# Patient Record
Sex: FEMALE | Race: WHITE | Employment: UNEMPLOYED | URBAN - METROPOLITAN AREA
[De-identification: names, ages, dates, MRNs, and addresses within clinical notes are randomized per-mention and may not be internally consistent; named-entity substitution may affect disease eponyms.]

---

## 2017-02-23 PROBLEM — Z98.890 H/O LEEP: Status: ACTIVE | Noted: 2017-02-23

## 2017-02-23 PROBLEM — Z34.90 PREGNANCY: Status: ACTIVE | Noted: 2017-02-23

## 2017-09-19 PROBLEM — Z23 ENCOUNTER FOR IMMUNIZATION: Status: ACTIVE | Noted: 2017-09-19

## 2017-09-27 ENCOUNTER — HOSPITAL ENCOUNTER (INPATIENT)
Age: 32
LOS: 2 days | Discharge: HOME | End: 2017-09-29
Attending: OBSTETRICS & GYNECOLOGY | Admitting: OBSTETRICS & GYNECOLOGY
Payer: COMMERCIAL

## 2017-09-27 ENCOUNTER — ANESTHESIA (OUTPATIENT)
Dept: LABOR AND DELIVERY | Age: 32
End: 2017-09-27
Payer: COMMERCIAL

## 2017-09-27 ENCOUNTER — ANESTHESIA EVENT (OUTPATIENT)
Dept: LABOR AND DELIVERY | Age: 32
End: 2017-09-27
Payer: COMMERCIAL

## 2017-09-27 DIAGNOSIS — Z3A.39 39 WEEKS GESTATION OF PREGNANCY: Primary | ICD-10-CM

## 2017-09-27 PROBLEM — Z37.9 NORMAL LABOR: Status: ACTIVE | Noted: 2017-09-27

## 2017-09-27 LAB
A1 MICROGLOB PLACENTAL VAG QL: POSITIVE
ABO + RH BLD: NORMAL
BASE DEFICIT BLDCOA-SCNC: 7.5 MMOL/L (ref 0–2)
BASE DEFICIT BLDCOV-SCNC: 5.6 MMOL/L (ref 1.9–7.7)
BDY SITE: ABNORMAL
BDY SITE: ABNORMAL
BLOOD GROUP ANTIBODIES SERPL: NORMAL
CONTROL LINE PRESENT?: YES
ERYTHROCYTE [DISTWIDTH] IN BLOOD BY AUTOMATED COUNT: 13.8 % (ref 11.9–14.6)
EXPIRATION DATE: NORMAL
HCO3 BLDCOA-SCNC: 21 MMOL/L (ref 22–26)
HCO3 BLDV-SCNC: 19 MMOL/L
HCT VFR BLD AUTO: 34.5 % (ref 35.8–46.3)
HGB BLD-MCNC: 11.3 G/DL (ref 11.7–15.4)
INTERNAL NEGATIVE CONTROL: NEGATIVE
KIT LOT NO.: NORMAL
MCH RBC QN AUTO: 27.7 PG (ref 26.1–32.9)
MCHC RBC AUTO-ENTMCNC: 32.8 G/DL (ref 31.4–35)
MCV RBC AUTO: 84.6 FL (ref 79.6–97.8)
PCO2 BLDCOA: 52 MMHG (ref 33–49)
PCO2 BLDCOV: 35 MMHG (ref 14.1–43.3)
PH BLDCOA: 7.22 [PH] (ref 7.21–7.31)
PH BLDCOV: 7.35 [PH] (ref 7.2–7.44)
PLATELET # BLD AUTO: 192 K/UL (ref 150–450)
PMV BLD AUTO: 10.4 FL (ref 10.8–14.1)
PO2 BLDCOA: 24 MMHG (ref 9–19)
PO2 BLDV: 26 MMHG (ref 30.4–57.2)
RBC # BLD AUTO: 4.08 M/UL (ref 4.05–5.25)
SERVICE CMNT-IMP: ABNORMAL
SPECIMEN EXP DATE BLD: NORMAL
WBC # BLD AUTO: 10.6 K/UL (ref 4.3–11.1)

## 2017-09-27 PROCEDURE — 77030018846 HC SOL IRR STRL H20 ICUM -A

## 2017-09-27 PROCEDURE — 74011250636 HC RX REV CODE- 250/636: Performed by: OBSTETRICS & GYNECOLOGY

## 2017-09-27 PROCEDURE — 74011000250 HC RX REV CODE- 250

## 2017-09-27 PROCEDURE — 77030014125 HC TY EPDRL BBMI -B: Performed by: ANESTHESIOLOGY

## 2017-09-27 PROCEDURE — 77030011943

## 2017-09-27 PROCEDURE — 74011250636 HC RX REV CODE- 250/636

## 2017-09-27 PROCEDURE — 85027 COMPLETE CBC AUTOMATED: CPT | Performed by: OBSTETRICS & GYNECOLOGY

## 2017-09-27 PROCEDURE — 65270000029 HC RM PRIVATE

## 2017-09-27 PROCEDURE — 84112 EVAL AMNIOTIC FLUID PROTEIN: CPT | Performed by: OBSTETRICS & GYNECOLOGY

## 2017-09-27 PROCEDURE — 77030003028 HC SUT VCRL J&J -A

## 2017-09-27 PROCEDURE — 82803 BLOOD GASES ANY COMBINATION: CPT

## 2017-09-27 PROCEDURE — 77030011945 HC CATH URIN INT ST MENT -A

## 2017-09-27 PROCEDURE — 86900 BLOOD TYPING SEROLOGIC ABO: CPT | Performed by: OBSTETRICS & GYNECOLOGY

## 2017-09-27 PROCEDURE — 4A1HXCZ MONITORING OF PRODUCTS OF CONCEPTION, CARDIAC RATE, EXTERNAL APPROACH: ICD-10-PCS | Performed by: OBSTETRICS & GYNECOLOGY

## 2017-09-27 PROCEDURE — 77030002888 HC SUT CHRMC J&J -A

## 2017-09-27 PROCEDURE — 99283 EMERGENCY DEPT VISIT LOW MDM: CPT

## 2017-09-27 PROCEDURE — 0KQM0ZZ REPAIR PERINEUM MUSCLE, OPEN APPROACH: ICD-10-PCS | Performed by: OBSTETRICS & GYNECOLOGY

## 2017-09-27 PROCEDURE — A4300 CATH IMPL VASC ACCESS PORTAL: HCPCS | Performed by: ANESTHESIOLOGY

## 2017-09-27 PROCEDURE — 0UQGXZZ REPAIR VAGINA, EXTERNAL APPROACH: ICD-10-PCS | Performed by: OBSTETRICS & GYNECOLOGY

## 2017-09-27 PROCEDURE — 59025 FETAL NON-STRESS TEST: CPT

## 2017-09-27 PROCEDURE — 74011250637 HC RX REV CODE- 250/637: Performed by: OBSTETRICS & GYNECOLOGY

## 2017-09-27 RX ORDER — ROPIVACAINE HYDROCHLORIDE 2 MG/ML
INJECTION, SOLUTION EPIDURAL; INFILTRATION; PERINEURAL AS NEEDED
Status: DISCONTINUED | OUTPATIENT
Start: 2017-09-27 | End: 2017-09-27 | Stop reason: HOSPADM

## 2017-09-27 RX ORDER — ROPIVACAINE HYDROCHLORIDE 2 MG/ML
INJECTION, SOLUTION EPIDURAL; INFILTRATION; PERINEURAL
Status: DISCONTINUED | OUTPATIENT
Start: 2017-09-27 | End: 2017-09-27 | Stop reason: HOSPADM

## 2017-09-27 RX ORDER — LIDOCAINE HYDROCHLORIDE 10 MG/ML
1 INJECTION INFILTRATION; PERINEURAL
Status: DISCONTINUED | OUTPATIENT
Start: 2017-09-27 | End: 2017-09-28 | Stop reason: HOSPADM

## 2017-09-27 RX ORDER — OXYTOCIN/RINGER'S LACTATE 15/250 ML
250 PLASTIC BAG, INJECTION (ML) INTRAVENOUS ONCE
Status: CANCELLED | OUTPATIENT
Start: 2017-09-27 | End: 2017-09-27

## 2017-09-27 RX ORDER — MINERAL OIL
120 OIL (ML) ORAL
Status: DISCONTINUED | OUTPATIENT
Start: 2017-09-27 | End: 2017-09-27 | Stop reason: SDUPTHER

## 2017-09-27 RX ORDER — DOCUSATE SODIUM 100 MG/1
100 CAPSULE, LIQUID FILLED ORAL 2 TIMES DAILY
Status: DISCONTINUED | OUTPATIENT
Start: 2017-09-28 | End: 2017-09-29 | Stop reason: HOSPADM

## 2017-09-27 RX ORDER — SODIUM CHLORIDE 0.9 % (FLUSH) 0.9 %
5-10 SYRINGE (ML) INJECTION EVERY 8 HOURS
Status: CANCELLED | OUTPATIENT
Start: 2017-09-27

## 2017-09-27 RX ORDER — NALOXONE HYDROCHLORIDE 0.4 MG/ML
0.4 INJECTION, SOLUTION INTRAMUSCULAR; INTRAVENOUS; SUBCUTANEOUS AS NEEDED
Status: DISCONTINUED | OUTPATIENT
Start: 2017-09-27 | End: 2017-09-29 | Stop reason: HOSPADM

## 2017-09-27 RX ORDER — OXYTOCIN/RINGER'S LACTATE 30/500 ML
.5-2 PLASTIC BAG, INJECTION (ML) INTRAVENOUS
Status: DISCONTINUED | OUTPATIENT
Start: 2017-09-27 | End: 2017-09-29 | Stop reason: HOSPADM

## 2017-09-27 RX ORDER — DEXTROSE, SODIUM CHLORIDE, SODIUM LACTATE, POTASSIUM CHLORIDE, AND CALCIUM CHLORIDE 5; .6; .31; .03; .02 G/100ML; G/100ML; G/100ML; G/100ML; G/100ML
125 INJECTION, SOLUTION INTRAVENOUS CONTINUOUS
Status: CANCELLED | OUTPATIENT
Start: 2017-09-27

## 2017-09-27 RX ORDER — FENTANYL CITRATE 50 UG/ML
INJECTION, SOLUTION INTRAMUSCULAR; INTRAVENOUS AS NEEDED
Status: DISCONTINUED | OUTPATIENT
Start: 2017-09-27 | End: 2017-09-27 | Stop reason: HOSPADM

## 2017-09-27 RX ORDER — DIPHENHYDRAMINE HCL 25 MG
25 CAPSULE ORAL
Status: DISCONTINUED | OUTPATIENT
Start: 2017-09-27 | End: 2017-09-29 | Stop reason: HOSPADM

## 2017-09-27 RX ORDER — LIDOCAINE HYDROCHLORIDE 20 MG/ML
JELLY TOPICAL
Status: DISCONTINUED | OUTPATIENT
Start: 2017-09-27 | End: 2017-09-28 | Stop reason: HOSPADM

## 2017-09-27 RX ORDER — SIMETHICONE 80 MG
80 TABLET,CHEWABLE ORAL
Status: DISCONTINUED | OUTPATIENT
Start: 2017-09-27 | End: 2017-09-29 | Stop reason: HOSPADM

## 2017-09-27 RX ORDER — MINERAL OIL
120 OIL (ML) ORAL AS NEEDED
Status: DISCONTINUED | OUTPATIENT
Start: 2017-09-27 | End: 2017-09-29 | Stop reason: HOSPADM

## 2017-09-27 RX ORDER — PROMETHAZINE HYDROCHLORIDE 25 MG/1
25 TABLET ORAL
Status: DISCONTINUED | OUTPATIENT
Start: 2017-09-27 | End: 2017-09-29 | Stop reason: HOSPADM

## 2017-09-27 RX ORDER — FAMOTIDINE 10 MG/ML
INJECTION INTRAVENOUS
Status: COMPLETED
Start: 2017-09-27 | End: 2017-09-27

## 2017-09-27 RX ORDER — OXYTOCIN/RINGER'S LACTATE 30/500 ML
500 PLASTIC BAG, INJECTION (ML) INTRAVENOUS ONCE
Status: COMPLETED | OUTPATIENT
Start: 2017-09-27 | End: 2017-09-27

## 2017-09-27 RX ORDER — ONDANSETRON 2 MG/ML
4 INJECTION INTRAMUSCULAR; INTRAVENOUS
Status: DISCONTINUED | OUTPATIENT
Start: 2017-09-27 | End: 2017-09-28 | Stop reason: HOSPADM

## 2017-09-27 RX ORDER — IBUPROFEN 800 MG/1
800 TABLET ORAL
Status: DISCONTINUED | OUTPATIENT
Start: 2017-09-27 | End: 2017-09-28

## 2017-09-27 RX ORDER — SODIUM CHLORIDE 0.9 % (FLUSH) 0.9 %
5-10 SYRINGE (ML) INJECTION AS NEEDED
Status: CANCELLED | OUTPATIENT
Start: 2017-09-27

## 2017-09-27 RX ORDER — OXYCODONE AND ACETAMINOPHEN 7.5; 325 MG/1; MG/1
1 TABLET ORAL
Status: DISCONTINUED | OUTPATIENT
Start: 2017-09-27 | End: 2017-09-28

## 2017-09-27 RX ORDER — ZOLPIDEM TARTRATE 5 MG/1
5 TABLET ORAL
Status: DISCONTINUED | OUTPATIENT
Start: 2017-09-27 | End: 2017-09-29 | Stop reason: HOSPADM

## 2017-09-27 RX ORDER — BUTORPHANOL TARTRATE 1 MG/ML
1 INJECTION INTRAMUSCULAR; INTRAVENOUS
Status: DISCONTINUED | OUTPATIENT
Start: 2017-09-27 | End: 2017-09-28 | Stop reason: HOSPADM

## 2017-09-27 RX ADMIN — OXYTOCIN 30000 MILLI-UNITS/HR: 10 INJECTION, SOLUTION INTRAMUSCULAR; INTRAVENOUS at 22:34

## 2017-09-27 RX ADMIN — FENTANYL CITRATE 100 MCG: 50 INJECTION, SOLUTION INTRAMUSCULAR; INTRAVENOUS at 19:52

## 2017-09-27 RX ADMIN — OXYTOCIN 30000 MILLI-UNITS/HR: 10 INJECTION, SOLUTION INTRAMUSCULAR; INTRAVENOUS at 22:59

## 2017-09-27 RX ADMIN — OXYTOCIN 2 MILLI-UNITS/MIN: 10 INJECTION, SOLUTION INTRAMUSCULAR; INTRAVENOUS at 08:10

## 2017-09-27 RX ADMIN — FAMOTIDINE: 10 INJECTION, SOLUTION INTRAVENOUS at 09:29

## 2017-09-27 RX ADMIN — ROPIVACAINE HYDROCHLORIDE 10 ML/HR: 2 INJECTION, SOLUTION EPIDURAL; INFILTRATION; PERINEURAL at 13:25

## 2017-09-27 RX ADMIN — ROPIVACAINE HYDROCHLORIDE 8 ML: 2 INJECTION, SOLUTION EPIDURAL; INFILTRATION; PERINEURAL at 13:21

## 2017-09-27 RX ADMIN — IBUPROFEN 800 MG: 800 TABLET ORAL at 23:31

## 2017-09-27 RX ADMIN — OXYTOCIN 6 MILLI-UNITS/MIN: 10 INJECTION, SOLUTION INTRAMUSCULAR; INTRAVENOUS at 09:35

## 2017-09-27 NOTE — H&P
History & Physical    Name: Jevon Keller MRN: [id number]  SSN: [id number]    YOB: 1985  Age: 36 y.o. Sex: female        Subjective: Pt presents with SROM since this morning at 0600AM.     Estimated Date of Delivery: 10/1/17  OB History    Para Term  AB Living   1        SAB TAB Ectopic Molar Multiple Live Births              # Outcome Date GA Lbr Brandon/2nd Weight Sex Delivery Anes PTL Lv   1 Current               Obstetric Comments   Patient presents for NOB talk. She is undecided on genetic testing. Patient is to return in 4 weeks for NOB exam. All questions answered. Patient voiced full understanding. Ms. Winter Enrique is seen with pregnancy at 39w3d for SROM. Prenatal course was normal.  the patients states that the baby moves as usual   Please see prenatal records for details. Past Medical History:   Diagnosis Date    Abnormal Papanicolaou smear of cervix     Migraines      Past Surgical History:   Procedure Laterality Date    HX COLPOSCOPY      HX LEEP PROCEDURE      HX OTHER SURGICAL      Jaw surgery     Social History     Occupational History    Not on file. Social History Main Topics    Smoking status: Never Smoker    Smokeless tobacco: Never Used    Alcohol use No    Drug use: No    Sexual activity: Yes     Partners: Male     Birth control/ protection: None     Family History   Problem Relation Age of Onset    Diabetes Maternal Grandmother        No Known Allergies  Prior to Admission medications    Medication Sig Start Date End Date Taking? Authorizing Provider   PNV COMBO#47/IRON/FA #1/DHA (PNV-DHA PO) Take  by mouth. Yes Historical Provider   diphenhydrAMINE (BENADRYL) 25 mg capsule Take 25 mg by mouth every six (6) hours as needed.     Historical Provider   butalbital-acetaminophen-caffeine (FIORICET, ESGIC) -40 mg per tablet Take 1-2 tablets by oral route every 8 hours as needed for migraine 17   Bertis Blizzard, NP   Cetirizine (ZYRTEC) 10 mg cap Take  by mouth. Historical Provider        Review of Systems:  Constitutional:No headache, fever  Cardiac:   No chest pain      Resp: No cough or shortness of breath     GI:   No nausea/vomiting, diarrhea, abdominal pain    :   No dysuria  Neuro:     No vision changes, headache      Objective:     Vitals:  Vitals:    17 0659 17 0700   BP:  114/78   Pulse:  (!) 112   Resp:  16   Temp:  99.1 °F (37.3 °C)   Weight: 81.6 kg (180 lb)    Height: 5' 6\" (1.676 m)         Physical Exam:  Patient without distress. Heart: Regular rate and rhythm  Lung: clear to auscultation throughout lung fields, no wheezes, no rales, no rhonchi and normal respiratory effort  Back: costovertebral angle tenderness absent  Abdomen: soft, nontender  Fundus: soft and non tender  Cervical Exam: 1-2cm/80%/vtx/-2  Lower Extremities: no evidence of DVT  Membranes:  Spontaneous Rupture of Membranes; Amniotic Fluid: clear fluid  Fetal Heart Rate: Baseline: 135 per minute  Variability: moderate  Accelerations: yes  Decelerations: none  Uterine contractions: regular, every 4-5 minutes    Prenatal Labs:   Lab Results   Component Value Date/Time    Rubella, External immune 2017    HBsAg, External neg 2017    HIV, External nr 2017    RPR, External nr 2017    Gonorrhea, External neg 2017    Chlamydia, External neg 2017         Assessment/Plan:     Ms. Lexie Conteh is a  seen with pregnancy at 39w3d for SROM. GBS is negative    Plan:     Admit for labor management    Patient discussed with Dr. Linda Brennan.

## 2017-09-27 NOTE — ANESTHESIA PREPROCEDURE EVALUATION
Anesthetic History   No history of anesthetic complications            Review of Systems / Medical History  Patient summary reviewed and pertinent labs reviewed    Pulmonary  Within defined limits                 Neuro/Psych         Headaches     Cardiovascular                  Exercise tolerance: >4 METS     GI/Hepatic/Renal  Within defined limits              Endo/Other  Within defined limits           Other Findings              Physical Exam    Airway  Mallampati: II  TM Distance: 4 - 6 cm  Neck ROM: normal range of motion   Mouth opening: Normal     Cardiovascular    Rhythm: regular  Rate: normal      Pertinent negatives: No murmur, JVD and peripheral edema   Dental  No notable dental hx       Pulmonary  Breath sounds clear to auscultation               Abdominal      Pertinent negatives:Ostomy present: gravid.    Other Findings            Anesthetic Plan    ASA: 2  Anesthesia type: epidural            Anesthetic plan and risks discussed with: Patient and Spouse

## 2017-09-27 NOTE — PROGRESS NOTES
Birthing ball brought to room. Pt given mesh panties and pads. Encouraged to use the ball as long as the baby is tracing.  Temp 98.4

## 2017-09-27 NOTE — IP AVS SNAPSHOT
[address] 
[phone] Patient: Olga Gaytan MRN: [id number] :1985 You are allergic to the following No active allergies Recent Documentation Height Weight Breastfeeding? BMI OB Status Smoking Status 1.676 m 81.6 kg Yes 29.05 kg/m2 Recent pregnancy Never Smoker Emergency Contacts Name Discharge Info [facility]ation [facility]  Spouse [3] [phone] About your hospitalization You were admitted on:  2017 You last received care in the:  [address] [facility] You were discharged on:  2017 Unit phone number:  [phone] Why you were hospitalized Your primary diagnosis was:  39 Weeks Gestation Of Pregnancy Your diagnoses also included:  Rupture Of Membranes With Clear Amniotic Fluid, Normal Labor Providers Seen During Your Hospitalizations Provider Role Specialty Primary office phone Heath Seip, DO Attending Provider Obstetrics & Gynecology [phone] Your Primary Care Physician (PCP) Primary Care Physician Office Phone Office Fax NONE ** None ** ** None ** Follow-up Information Follow up With Details Comments Contact Info None   None ([phone]) Patient stated that they have no PCP Ashlie Condon MD In 2 weeks Follow up with [facility] in 2 weeks call for appointment [address] [place] [address] OB GYN Group [facility] [address] 
[phone] Current Discharge Medication List  
  
START taking these medications Dose & Instructions Dispensing Information Comments Morning Noon Evening Bedtime HYDROcodone-acetaminophen 0.5-21.7 mg/mL oral solution Commonly known as:  HYCET Your last dose was: Your next dose is:    
   
   
 Dose:  15 mL Take 15 mL by mouth every four (4) hours as needed. Max Daily Amount: 45 mg.  
 Quantity:  420 mL Refills:  0 CONTINUE these medications which have NOT CHANGED Dose & Instructions Dispensing Information Comments Morning Noon Evening Bedtime BENADRYL 25 mg capsule Generic drug:  diphenhydrAMINE Your last dose was: Your next dose is:    
   
   
 Dose:  25 mg Take 25 mg by mouth every six (6) hours as needed. Refills:  0  
     
   
   
   
  
 butalbital-acetaminophen-caffeine -40 mg per tablet Commonly known as:  Tamie Haywood Your last dose was: Your next dose is: Take 1-2 tablets by oral route every 8 hours as needed for migraine Quantity:  30 Tab Refills:  2 PNV-DHA PO Your last dose was: Your next dose is: Take  by mouth. Refills:  0 ZyrTEC 10 mg Cap Generic drug:  Cetirizine Your last dose was: Your next dose is: Take  by mouth. Refills:  0 Where to Get Your Medications Information on where to get these meds will be given to you by the nurse or doctor. ! Ask your nurse or doctor about these medications HYDROcodone-acetaminophen 0.5-21.7 mg/mL oral solution Discharge Instructions After Your Delivery (the Postpartum Period): Care Instructions Your Care Instructions Congratulations on the birth of your baby. Like pregnancy, the  period can be a time of excitement, wade, and exhaustion. You may look at your wondrous little baby and feel happy. You may also be overwhelmed by your new sleep hours and new responsibilities. At first, babies often sleep during the days and are awake at night. They do not have a pattern or routine. They may make sudden gasps, jerk themselves awake, or look like they have crossed eyes.  These are all normal, and they may even make you smile. In these first weeks after delivery, try to take good care of yourself. It may take 4 to 6 weeks to feel like yourself again, and possibly longer if you had a  birth. You will likely feel very tired for several weeks. Your days will be full of ups and downs, but lots of wade as well. Follow-up care is a key part of your treatment and safety. Be sure to make and go to all appointments, and call your doctor if you are having problems. It's also a good idea to know your test results and keep a list of the medicines you take. How can you care for yourself at home? Take care of your body after delivery · Use pads instead of tampons for the bloody flow that may last as long as 2 weeks. · Ease cramps with ibuprofen (Advil, Motrin). · Ease soreness of hemorrhoids and the area between your vagina and rectum with ice compresses or witch hazel pads. · Ease constipation by drinking lots of fluid and eating high-fiber foods. Ask your doctor about over-the-counter stool softeners. · Cleanse yourself with a gentle squeeze of warm water from a bottle instead of wiping with toilet paper. · Take a sitz bath in warm water several times a day. · Wear a good nursing bra. Ease sore and swollen breasts with warm, wet washcloths. · If you are not breastfeeding, use ice rather than heat for breast soreness. · Your period may not start for several months if you are breastfeeding. You may bleed more, and longer at first, than you did before you got pregnant. · Wait until you are healed (about 4 to 6 weeks) before you have sexual intercourse. Your doctor will tell you when it is okay to have sex. · Try not to travel with your baby for 5 or 6 weeks. If you take a long car trip, make frequent stops to walk around and stretch. Avoid exhaustion · Rest every day. Try to nap when your baby naps. · Ask another adult to be with you for a few days after delivery. · Plan for  if you have other children. · Stay flexible so you can eat at odd hours and sleep when you need to. Both you and your baby are making new schedules. · Plan small trips to get out of the house. Change can make you feel less tired. · Ask for help with housework, cooking, and shopping. Remind yourself that your job is to care for your baby. Know about help for postpartum depression · \"Baby blues\" are common for the first 1 to 2 weeks after birth. You may cry or feel sad or irritable for no reason. · Rest whenever you can. Being tired makes it harder to handle your emotions. · Go for walks with your baby. · Talk to your partner, friends, and family about your feelings. · If your symptoms last for more than a few weeks, or if you feel very depressed, ask your doctor for help. · Postpartum depression can be treated. Support groups and counseling can help. Sometimes medicine can also help. Stay healthy · Eat healthy foods so you have more energy, make good breast milk, and lose extra baby pounds. · If you breastfeed, avoid alcohol and drugs. Stay smoke-free. If you quit during pregnancy, congratulations. · Start daily exercise after 4 to 6 weeks, but rest when you feel tired. · Learn exercises to tone your belly. Do Kegel exercises to regain strength in your pelvic muscles. You can do these exercises while you stand or sit. ¨ Squeeze the same muscles you would use to stop your urine. Your belly and thighs should not move. ¨ Hold the squeeze for 3 seconds, and then relax for 3 seconds. ¨ Start with 3 seconds. Then add 1 second each week until you are able to squeeze for 10 seconds. ¨ Repeat the exercise 10 to 15 times for each session. Do three or more sessions each day. · Find a class for new mothers and new babies that has an exercise time. · If you had a  birth, give yourself a bit more time before you exercise, and be careful. When should you call for help? Call 911 anytime you think you may need emergency care. For example, call if: 
· You passed out (lost consciousness). Call your doctor now or seek immediate medical care if: 
· You have severe vaginal bleeding. This means you are passing blood clots and soaking through a pad each hour for 2 or more hours. · You are dizzy or lightheaded, or you feel like you may faint. · You have a fever. · You have new belly pain, or your pain gets worse. Watch closely for changes in your health, and be sure to contact your doctor if: 
· Your vaginal bleeding seems to be getting heavier. · You have new or worse vaginal discharge. · You feel sad, anxious, or hopeless for more than a few days. · You do not get better as expected. Where can you learn more? Go to http://saurabh-dian.info/. Enter A461 in the search box to learn more about \"After Your Delivery (the Postpartum Period): Care Instructions. \" Current as of: March 16, 2017 Content Version: 11.3 © 3922-0677 [employer]. Care instructions adapted under license by [employer] (which disclaims liability or warranty for this information). If you have questions about a medical condition or this instruction, always ask your healthcare professional. [place] [address] any warranty or liability for your use of this information. Discharge Orders Procedure Order Date Status Priority Quantity Spec Type Associated Dx CALL YOUR DOCTOR For: Difficulty breathing, headache, or visual disturbances. , Extreme fatigue. , Persistant dizziness or light-headedness. , Persistant nausea and vomiting., Redness, tenderness, or signs of infection. , Severe uncontrolled pain., Te... 09/29/17 0931 Normal Routine 1  39 weeks gestation of pregnancy [[id number]] Questions: For:  Difficulty breathing, headache, or visual disturbances. For:  Extreme fatigue. For:  Persistant dizziness or light-headedness. For:  Persistant nausea and vomiting. For:  Redness, tenderness, or signs of infection. For:  Severe uncontrolled pain. For:  Temperature greater than 100.4. ACTIVITY AFTER DISCHARGE Patient should: Restrict driving, Restrict lifting, Restrict sexual activity. Pelvic Rest 09/29/17 0931 Normal Routine 1  39 weeks gestation of pregnancy [[id number]] Comments:  Pelvic Rest  
  Questions: Patient should:  Restrict driving Patient should:  Restrict lifting Patient should:  Restrict sexual activity. DIET REGULAR No added salt 09/29/17 0931 Normal Routine 1  39 weeks gestation of pregnancy [[id number]] Questions: Additional options:  No added salt [employer] Announcement We are excited to announce that we are making your provider's discharge notes available to you in [employer]. You will see these notes when they are completed and signed by the physician that discharged you from your recent hospital stay. If you have any questions or concerns about any information you see in BovControl, please call the Health Information Department where you were seen or reach out to your Primary Care Provider for more information about your plan of care. Introducing [facility]! [facility] introduces [employer]ovControl patient portal. Now you can access parts of your medical record, email your doctor's office, and request medication refills online. 1. In your internet browser, go to [email or web address] 2. Click on the First Time User? Click Here link in the Sign In box. You will see the New Member Sign Up page. 3. Enter your BovControl Access Code exactly as it appears below. You will not need to use this code after youve completed the sign-up process. If you do not sign up before the expiration date, you must request a new code. · [employer] Access Code: [email or web address] Expires: 11/6/2017  1:41 PM 
 
 4. Enter the last four digits of your Social Security Number (xxxx) and Date of Birth (mm/dd/yyyy) as indicated and click Submit. You will be taken to the next sign-up page. 5. Create a TastyKhana ID. This will be your [employer]asty[employer] login ID and cannot be changed, so think of one that is secure and easy to remember. 6. Create a [employer]astyKh[employer] password. You can change your password at any time. 7. Enter your Password Reset Question and Answer. This can be used at a later time if you forget your password. 8. Enter your e-mail address. You will receive e-mail notification when new information is available in [address] 9. Click Sign Up. You can now view and download portions of your medical record. 10. Click the Download Summary menu link to download a portable copy of your medical information. If you have questions, please visit the Frequently Asked Questions section of the TastyKh[employer] website. Remember, TastyKhana is NOT to be used for urgent needs. For medical emergencies, dial 911. Now available from your iPhone and Android! General Information Please provide this summary of care documentation to your next provider. Patient Signature:  ____________________________________________________________ Date:  ____________________________________________________________  
  
Jett Sport Provider Signature:  ____________________________________________________________ Date:  ____________________________________________________________

## 2017-09-27 NOTE — PROGRESS NOTES
thomas from [facility]. Pt asssumed for care. Sitting up in the bed. No questions at this time. Discussed plan of care.

## 2017-09-27 NOTE — PROGRESS NOTES
Airam Hung at bedside at . MONALISA García at bedside at     Assisted pt to sitting up on bedside at . Timeout completed at 1310 with MD, MONALISA and myself at bedside. Test dose given at 1319. Negative reaction. Dose given at [phone]. Pt assisted to lying back in left tilt position. See anesthesia record for details. See vital sign flow sheet for BP. Tolerated procedure well.

## 2017-09-27 NOTE — H&P
Subjective:     Daniel Woodruff, MRN: [id number], is a 36 y.o.  female presents with TIUP  With SROM. unchanged course. See office notes on prenatal care.     Patient Active Problem List    Diagnosis Date Noted    Rupture of membranes with clear amniotic fluid 09/27/2017    39 weeks gestation of pregnancy 09/27/2017    Encounter for immunization 09/19/2017    Pregnancy 02/23/2017    H/O LEEP 02/23/2017     Past Medical History:   Diagnosis Date    Abnormal Papanicolaou smear of cervix     Migraines       Past Surgical History:   Procedure Laterality Date    HX COLPOSCOPY      HX LEEP PROCEDURE      HX OTHER SURGICAL      Jaw surgery      [unfilled]  No Known Allergies   Social History   Substance Use Topics    Smoking status: Never Smoker    Smokeless tobacco: Never Used    Alcohol use No      Family History   Problem Relation Age of Onset    Diabetes Maternal Grandmother         Prenatal Labs: Lab Results   Component Value Date/Time    Rubella, External immune 02/23/2017    HBsAg, External neg 02/23/2017    HIV, External nr 02/23/2017    RPR, External nr 02/23/2017    Gonorrhea, External neg 03/24/2017    Chlamydia, External neg 03/24/2017        Review of Systems  Constitutional: negative  Respiratory: negative  Cardiovascular: negative  Musculoskeletal:negative    Objective:     Patient Vitals for the past 8 hrs:   BP Temp Pulse Resp Height Weight   09/27/17 0700 114/78 99.1 °F (37.3 °C) (!) 112 16 - -   09/27/17 0659 - - - - 5' 6\" (1.676 m) 180 lb (81.6 kg)     No intake or output data in the 24 hours ending 09/27/17 0926  Visit Vitals    /78 (BP 1 Location: Right arm, BP Patient Position: At rest)    Pulse (!) 112    Temp 99.1 °F (37.3 °C)    Resp 16    Ht 5' 6\" (1.676 m)    Wt 180 lb (81.6 kg)    LMP 12/25/2016 (Exact Date)    BMI 29.05 kg/m2     General appearance: alert, cooperative, no distress, appears stated age  Head: Normocephalic, without obvious abnormality, atraumatic  Back: symmetric, no curvature. ROM normal. No CVA tenderness. Lungs: clear to auscultation bilaterally  Heart: regular rate and rhythm, S1, S2 normal, no murmur, click, rub or gallop  Abdomen:  gravid at term  Pelvic: External genitalia normal, Vagina normal without discharge, cervix 1 cm  Extremities: extremities normal, atraumatic, no cyanosis or edema  Pulses: 2+ and symmetric  Skin: Skin color, texture, turgor normal. No rashes or lesions      Assessment:     Principal Problem:    39 weeks gestation of pregnancy (9/27/2017)    Active Problems:    Rupture of membranes with clear amniotic fluid (9/27/2017)         All questions answered, will proceed.     TIUP , beta neg  Plan:         TIUP, HUNG,  exp mgt, cont pitocin augmentation

## 2017-09-27 NOTE — IP AVS SNAPSHOT
[facility] 
 
 
 [facility] [address] [facility] 
[phone] Patient: Linda Gunter MRN: [id number] :1985 Current Discharge Medication List  
  
START taking these medications Dose & Instructions Dispensing Information Comments Morning Noon Evening Bedtime HYDROcodone-acetaminophen 0.5-21.7 mg/mL oral solution Commonly known as:  HYCET Your last dose was: Your next dose is:    
   
   
 Dose:  15 mL Take 15 mL by mouth every four (4) hours as needed. Max Daily Amount: 45 mg.  
 Quantity:  420 mL Refills:  0 CONTINUE these medications which have NOT CHANGED Dose & Instructions Dispensing Information Comments Morning Noon Evening Bedtime BENADRYL 25 mg capsule Generic drug:  diphenhydrAMINE Your last dose was: Your next dose is:    
   
   
 Dose:  25 mg Take 25 mg by mouth every six (6) hours as needed. Refills:  0  
     
   
   
   
  
 butalbital-acetaminophen-caffeine -40 mg per tablet Commonly known as:  Anupam Quintero Your last dose was: Your next dose is: Take 1-2 tablets by oral route every 8 hours as needed for migraine Quantity:  30 Tab Refills:  2 PNV-DHA PO Your last dose was: Your next dose is: Take  by mouth. Refills:  0 ZyrTEC 10 mg Cap Generic drug:  Cetirizine Your last dose was: Your next dose is: Take  by mouth. Refills:  0 Where to Get Your Medications Information on where to get these meds will be given to you by the nurse or doctor. ! Ask your nurse or doctor about these medications HYDROcodone-acetaminophen 0.5-21.7 mg/mL oral solution

## 2017-09-27 NOTE — ANESTHESIA PROCEDURE NOTES
Epidural Block    Start time: 9/27/2017 1:11 PM  End time: 9/27/2017 1:18 PM  Performed by: Nicolle Mike  Authorized by: Nicolle Mike     Pre-Procedure  Indication: labor epidural    Preanesthetic Checklist: patient identified, risks and benefits discussed, anesthesia consent, patient being monitored, timeout performed and anesthesia consent    Timeout Time: 13:11        Epidural:   Patient position:  Seated  Prep region:  Lumbar  Prep: Chlorhexidine    Location:  L3-4    Needle and Epidural Catheter:   Needle Type:  Tuohy  Needle Gauge:  18 G  Injection Technique:  Loss of resistance using saline  Attempts:  1  Catheter Size:  19 G  Catheter at Skin Depth (cm):  10  Depth in Epidural Space (cm):  4.5  Events: no blood with aspiration, no cerebrospinal fluid with aspiration, no paresthesia and negative aspiration test    Test Dose:  Lidocaine 1.5% w/ epi    Assessment:   Catheter Secured:  Tegaderm and tape  Insertion:  Uncomplicated  Patient tolerance:  Patient tolerated the procedure well with no immediate complications  Discussed the risks and benefits of epidural placement and use with patient including the risk of wet tap and spinal headache, inadequate analgesia, need for replacement of epidural.  After the patient agreed to proceed I ensured the patient had no contraindications to Labor epidural placement including prohibitive heart defects, hypocoagulation, family or personal history of a bleeding disorder. Epidural placement is described in the block note. Frequent monitoring in the first 20-30 minutes following initial placement was performed. Patient and RN were instructed to call anytime with any questions.

## 2017-09-27 NOTE — PROGRESS NOTES
Pt off monitors to be transferred to L&D for admission. Report to Rodger Abraham RN. Pt care relinquished.

## 2017-09-27 NOTE — PROGRESS NOTES
Pt transferred to  434 for labor. IV started and labs sent. Consents witnessed. Dr Faustino Phan given update on pt status. Orders to start pitocin.

## 2017-09-28 PROCEDURE — 65270000029 HC RM PRIVATE

## 2017-09-28 PROCEDURE — 77010026065 HC OXYGEN MINIMUM MEDICAL AIR

## 2017-09-28 PROCEDURE — 74011250637 HC RX REV CODE- 250/637: Performed by: OBSTETRICS & GYNECOLOGY

## 2017-09-28 PROCEDURE — 76060000078 HC EPIDURAL ANESTHESIA

## 2017-09-28 PROCEDURE — 75410000002 HC LABOR FEE PER 1 HR

## 2017-09-28 PROCEDURE — 75410000003 HC RECOV DEL/VAG/CSECN EA 0.5 HR

## 2017-09-28 RX ORDER — HYDROCODONE BITARTRATE AND ACETAMINOPHEN 7.5; 325 MG/15ML; MG/15ML
7.5 SOLUTION ORAL
Status: DISCONTINUED | OUTPATIENT
Start: 2017-09-28 | End: 2017-09-29 | Stop reason: HOSPADM

## 2017-09-28 RX ORDER — TRIPROLIDINE/PSEUDOEPHEDRINE 2.5MG-60MG
800 TABLET ORAL
Status: DISCONTINUED | OUTPATIENT
Start: 2017-09-28 | End: 2017-09-29 | Stop reason: HOSPADM

## 2017-09-28 RX ADMIN — IBUPROFEN 800 MG: 200 SUSPENSION ORAL at 11:24

## 2017-09-28 RX ADMIN — BENZOCAINE AND MENTHOL 1 SPRAY: 20; .5 SPRAY TOPICAL at 11:23

## 2017-09-28 RX ADMIN — DOCUSATE SODIUM 100 MG: 100 CAPSULE, LIQUID FILLED ORAL at 21:29

## 2017-09-28 RX ADMIN — IBUPROFEN 800 MG: 800 TABLET ORAL at 05:44

## 2017-09-28 RX ADMIN — HYDROCODONE BITARTRATE AND ACETAMINOPHEN 7.5 MG: 7.5; 325 SOLUTION ORAL at 16:12

## 2017-09-28 RX ADMIN — HYDROCODONE BITARTRATE AND ACETAMINOPHEN 7.5 MG: 7.5; 325 SOLUTION ORAL at 21:30

## 2017-09-28 RX ADMIN — IBUPROFEN 800 MG: 200 SUSPENSION ORAL at 20:54

## 2017-09-28 RX ADMIN — WITCH HAZEL 1 PAD: 500 SOLUTION RECTAL; TOPICAL at 02:17

## 2017-09-28 NOTE — LACTATION NOTE
This note was copied from a baby's chart. Called back in by mom for feeding attempt. Infant awake. Got infant skin to skin with mom and showed her how to hand express. Drops of colostrum easily brought to surface. Helped mom get baby positioned well in football position and assisted her in showing how to get deep latch. Infant would easily push and resist against hands guiding to breast, so took several attempts before infant stayed and latched. Once we got baby on deeply, she fed very well and consistently for 5 minutes. Showed parents signs of good latch, alignment and active sucking. Infant feel off breast after these 5 minutes and would only hold breast in mouth. Tried baby on left breast in same position to see if more comfortable laying on other side. Baby resisted at first, but then would place mouth on breast and fall asleep despite stimulation provided to suck. Reviewed 1st 24 hr feeding expectations again with mom and encouraged her to keep attempting at breast today, aiming for at least 15-20 minutes feeds, burp and offer other side. If baby is not consistently feeding this well at 25 hrs old tonight, encouraged her to have RN teach her to pump so she can stimulate breasts and provide colostrum pumped back to infant. Did review normal pumping volumes for first 1-2 days. Mom verbalized understanding. Mom's nipple looked tender upon initial assessment, therefore gave her lanolin sample and encouraged her to alternate this with coconut oil to nipples after feeds. Provided cold pack as well. Mom very appreciative of help. In to use bathroom. Lactation to continue to follow for support.

## 2017-09-28 NOTE — PROGRESS NOTES
SBAR IN Report: Mother    Verbal report received from Nomi Schmidt RN on this patient, who is now being transferred from labor and delivery for routine progression of care. The patient is not wearing a green \"Anesthesia-Duramorph\" band. Report consisted of patient's Situation, Background, Assessment and Recommendations (SBAR). [place] ID bands were compared with the identification form, and verified with the patient and transferring nurse. Information from the SBAR, Kardex, Procedure Summary, Intake/Output, MAR and Recent Results and the [place] Report was reviewed with the transferring nurse; opportunity for questions and clarification provided.

## 2017-09-28 NOTE — PROGRESS NOTES
Delivery Note    Dr German Hilliard arrived to bedside at [phone]. Pt positioned for delivery and set up at [address] Spontaneous vaginal delivery of viable female infant. Apgar's 9/9. Perineum with second degree laceration and repaired. See delivery summary for details.

## 2017-09-28 NOTE — LACTATION NOTE

## 2017-09-28 NOTE — ANESTHESIA POSTPROCEDURE EVALUATION
Post-Anesthesia Evaluation and Assessment    Patient: Ceci Samayoa MRN: [id number]  SSN: [id number]    YOB: 1985  Age: 36 y.o. Sex: female       Cardiovascular Function/Vital Signs  Visit Vitals    BP 99/55 (BP 1 Location: Right arm, BP Patient Position: At rest)    Pulse 94    Temp 36.9 °C (98.5 °F)    Resp 16    Ht 5' 6\" (1.676 m)    Wt 81.6 kg (180 lb)    Breastfeeding Yes    BMI 29.05 kg/m2       Patient is status post No value filed. anesthesia for * No procedures listed *. Nausea/Vomiting: None    Postoperative hydration reviewed and adequate. Pain:  Pain Scale 1: Numeric (0 - 10) (09/28/17 0544)  Pain Intensity 1: 6 (09/28/17 0544)   Managed    Neurological Status:   Neuro (WDL): Within Defined Limits (09/27/17 2236)   At baseline    Mental Status and Level of Consciousness: Arousable    Pulmonary Status:   O2 Device: Room air (09/28/17 0230)   Adequate oxygenation and airway patent    Complications related to anesthesia: None    Post-anesthesia assessment completed.  No concerns    Signed By: Anel Murphy MD     September 28, 2017

## 2017-09-28 NOTE — PROGRESS NOTES
Epidural cath removed. Rakel care performed. New ice pack and replaced rakel pad. Pt repositioned with assist to high fowlers, eating dinner. Family @ bedside to visit. Denies needs at this time. Will continue to monitor.

## 2017-09-28 NOTE — PROGRESS NOTES
SBAR OUT Report: Mother    Verbal report given to Yanci Burden RN (full name & credentials) on this patient, who is now being transferred to MIU (unit) for routine progression of care. The patient is not wearing a green \"Anesthesia-Duramorph\" band. Report consisted of patient's Situation, Background, Assessment and Recommendations (SBAR). [place] ID bands were compared with the identification form, and verified with the patient and receiving nurse. Information from the SBAR, Kardex, Procedure Summary, Intake/Output, MAR and Accordion and the [place] Report was reviewed with the receiving nurse; opportunity for questions and clarification provided.

## 2017-09-28 NOTE — PROGRESS NOTES
Pt pushing. Oxygen via mask between contractions. Washington Barrientos notified about strip.  In and out cath for 300v more ccs of clear yellow urine

## 2017-09-28 NOTE — LACTATION NOTE
This note was copied from a baby's chart. In to see mom and infant for first time. Mom states infant has latched and fed sometimes for short amounts of times and others just attempts. Easily falls asleep at breast. Baby asleep at this time, so encouraged mom to call out at next feeding attempt for assistance/observation. Reviewed admission info and 1st 24 hr feeding/output expectations. Discussed \"second night of life\" and normalcy of cluster feeding. Will await mom to call out later today.

## 2017-09-28 NOTE — PROGRESS NOTES
Pt having some discomfort in perineum. Rates pain 1-2/10. Ibuprofen 800 mg given PO for pain and swelling.

## 2017-09-28 NOTE — PROGRESS NOTES
Admission assessment completed. Educated patient on plan of care. Discussed pain medication options.

## 2017-09-28 NOTE — PROGRESS NOTES
Assumed care of pt. CARROLL 8/100/0    In and out cath for 300cc of banda yellow urine. Dr. Washington Devi in room. Reviewed plan of care with pt. No questions at this time.

## 2017-09-28 NOTE — PROGRESS NOTES
Chart reviewed due to first time parent - no needs identified.  met with family and provided education on [facility].  Family declined referral for home visit.     Emogene Rashid, [address] [facility]

## 2017-09-28 NOTE — PROCEDURES
Delivery Note    Patient Name: Maxx Vasques  MRN: [id number]    Obstetrician:  Brittany Aponte MD    Assistant: none    Pre-Delivery Diagnosis: Term pregnancy, Induced labor, Single fetus or Uncomplicated pregnancy    Post-Delivery Diagnosis: Female    Intrapartum Event: None    Procedure: Spontaneous vaginal delivery    Epidural: YES    Monitor:  Fetal Heart Tones - External and Uterine Contractions - External    Indications for instrumental delivery: none    Estimated Blood Loss: 300    Episiotomy: none    Laceration(s):  2nd degree, and bilateral large vaginal sulcus tears    Laceration(s) repair: YES    Presentation: Cephalic    Fetal Description: mireles    Fetal Position: Right Occiput Anterior    Birth Weight: 7-5    Birth Length: 51    Apgar - One Minute: 9    Apgar - Five Minutes: 9    Umbilical Cord: Nuchal Cord x  1, 3 vessels present and Cord blood sent to lab for type, Rh, and Cesar' test    Specimens: no           Complications:  none           No components found for: OBEXTABO/RH,  OBEXTANTIBODY,  OBEXTRUBELLA,  OBEXTGRBS         Attending Attestation: I was present and scrubbed for the entire procedure

## 2017-09-28 NOTE — PROGRESS NOTES
Post-Delivery Day Number 1 Progress Note    Patient doing well post-delivery day 1 from vaginal delivery without significant complaints. Pain controlled on current medication. Tolerating diet. Ambulating/Voiding without difficulty, normal lochia. Vitals:  Blood pressure 113/72, pulse 95, temperature 98.9 °F (37.2 °C), resp. rate 16, height 5' 6\" (1.676 m), weight 180 lb (81.6 kg), last menstrual period 12/25/2016, currently breastfeeding. Vital signs stable, afebrile. Exam:  Patient without distress. Abdomen soft, fundus firm at level of umbilicus, nontender. Lower extremities are negative for swelling, cords or tenderness. Lochia- moderate    Labs: No lab exists for component: HBG    Assessment and Plan:  Patient appears to be having uncomplicated post-vaginal delivery course. Continue routine post-op care and maternal education.

## 2017-09-28 NOTE — PROGRESS NOTES
Pt up to bathroom with RN assistance. Roseanne-care completed and taught. Educated mother on using tucks. Gown changed. Pt verbalizes understanding.

## 2017-09-28 NOTE — PROGRESS NOTES
Legs out of stirrups. Roseanne-care done, ice pack applied. Fundus firm and -3 below umbilicus. Epidural pump stopped.

## 2017-09-29 VITALS
HEART RATE: 92 BPM | RESPIRATION RATE: 19 BRPM | WEIGHT: 180 LBS | TEMPERATURE: 98.3 F | DIASTOLIC BLOOD PRESSURE: 61 MMHG | BODY MASS INDEX: 28.93 KG/M2 | HEIGHT: 66 IN | SYSTOLIC BLOOD PRESSURE: 105 MMHG

## 2017-09-29 PROCEDURE — 74011250637 HC RX REV CODE- 250/637: Performed by: OBSTETRICS & GYNECOLOGY

## 2017-09-29 RX ORDER — HYDROCODONE BITARTRATE AND ACETAMINOPHEN 7.5; 325 MG/15ML; MG/15ML
15 SOLUTION ORAL
Qty: 420 ML | Refills: 0 | Status: SHIPPED | OUTPATIENT
Start: 2017-09-29 | End: 2017-11-07

## 2017-09-29 RX ADMIN — HYDROCODONE BITARTRATE AND ACETAMINOPHEN 7.5 MG: 7.5; 325 SOLUTION ORAL at 03:03

## 2017-09-29 RX ADMIN — IBUPROFEN 800 MG: 200 SUSPENSION ORAL at 14:01

## 2017-09-29 RX ADMIN — HYDROCODONE BITARTRATE AND ACETAMINOPHEN 7.5 MG: 7.5; 325 SOLUTION ORAL at 14:01

## 2017-09-29 RX ADMIN — DOCUSATE SODIUM 100 MG: 100 CAPSULE, LIQUID FILLED ORAL at 07:14

## 2017-09-29 RX ADMIN — IBUPROFEN 800 MG: 200 SUSPENSION ORAL at 07:13

## 2017-09-29 RX ADMIN — BENZOCAINE AND MENTHOL 1 SPRAY: 20; .5 SPRAY TOPICAL at 14:00

## 2017-09-29 RX ADMIN — HYDROCODONE BITARTRATE AND ACETAMINOPHEN 7.5 MG: 7.5; 325 SOLUTION ORAL at 07:14

## 2017-09-29 NOTE — LACTATION NOTE

## 2017-09-29 NOTE — DISCHARGE INSTRUCTIONS
After Your Delivery (the Postpartum Period): Care Instructions  Your Care Instructions  Congratulations on the birth of your baby. Like pregnancy, the  period can be a time of excitement, wade, and exhaustion. You may look at your wondrous little baby and feel happy. You may also be overwhelmed by your new sleep hours and new responsibilities. At first, babies often sleep during the days and are awake at night. They do not have a pattern or routine. They may make sudden gasps, jerk themselves awake, or look like they have crossed eyes. These are all normal, and they may even make you smile. In these first weeks after delivery, try to take good care of yourself. It may take 4 to 6 weeks to feel like yourself again, and possibly longer if you had a  birth. You will likely feel very tired for several weeks. Your days will be full of ups and downs, but lots of wade as well. Follow-up care is a key part of your treatment and safety. Be sure to make and go to all appointments, and call your doctor if you are having problems. It's also a good idea to know your test results and keep a list of the medicines you take. How can you care for yourself at home? Take care of your body after delivery  · Use pads instead of tampons for the bloody flow that may last as long as 2 weeks. · Ease cramps with ibuprofen (Advil, Motrin). · Ease soreness of hemorrhoids and the area between your vagina and rectum with ice compresses or witch hazel pads. · Ease constipation by drinking lots of fluid and eating high-fiber foods. Ask your doctor about over-the-counter stool softeners. · Cleanse yourself with a gentle squeeze of warm water from a bottle instead of wiping with toilet paper. · Take a sitz bath in warm water several times a day. · Wear a good nursing bra. Ease sore and swollen breasts with warm, wet washcloths. · If you are not breastfeeding, use ice rather than heat for breast soreness.   · Your period may not start for several months if you are breastfeeding. You may bleed more, and longer at first, than you did before you got pregnant. · Wait until you are healed (about 4 to 6 weeks) before you have sexual intercourse. Your doctor will tell you when it is okay to have sex. · Try not to travel with your baby for 5 or 6 weeks. If you take a long car trip, make frequent stops to walk around and stretch. Avoid exhaustion  · Rest every day. Try to nap when your baby naps. · Ask another adult to be with you for a few days after delivery. · Plan for  if you have other children. · Stay flexible so you can eat at odd hours and sleep when you need to. Both you and your baby are making new schedules. · Plan small trips to get out of the house. Change can make you feel less tired. · Ask for help with housework, cooking, and shopping. Remind yourself that your job is to care for your baby. Know about help for postpartum depression  · \"Baby blues\" are common for the first 1 to 2 weeks after birth. You may cry or feel sad or irritable for no reason. · Rest whenever you can. Being tired makes it harder to handle your emotions. · Go for walks with your baby. · Talk to your partner, friends, and family about your feelings. · If your symptoms last for more than a few weeks, or if you feel very depressed, ask your doctor for help. · Postpartum depression can be treated. Support groups and counseling can help. Sometimes medicine can also help. Stay healthy  · Eat healthy foods so you have more energy, make good breast milk, and lose extra baby pounds. · If you breastfeed, avoid alcohol and drugs. Stay smoke-free. If you quit during pregnancy, congratulations. · Start daily exercise after 4 to 6 weeks, but rest when you feel tired. · Learn exercises to tone your belly. Do Kegel exercises to regain strength in your pelvic muscles. You can do these exercises while you stand or sit.   ¨ Squeeze the same muscles you would use to stop your urine. Your belly and thighs should not move. ¨ Hold the squeeze for 3 seconds, and then relax for 3 seconds. ¨ Start with 3 seconds. Then add 1 second each week until you are able to squeeze for 10 seconds. ¨ Repeat the exercise 10 to 15 times for each session. Do three or more sessions each day. · Find a class for new mothers and new babies that has an exercise time. · If you had a  birth, give yourself a bit more time before you exercise, and be careful. When should you call for help? Call 911 anytime you think you may need emergency care. For example, call if:  · You passed out (lost consciousness). Call your doctor now or seek immediate medical care if:  · You have severe vaginal bleeding. This means you are passing blood clots and soaking through a pad each hour for 2 or more hours. · You are dizzy or lightheaded, or you feel like you may faint. · You have a fever. · You have new belly pain, or your pain gets worse. Watch closely for changes in your health, and be sure to contact your doctor if:  · Your vaginal bleeding seems to be getting heavier. · You have new or worse vaginal discharge. · You feel sad, anxious, or hopeless for more than a few days. · You do not get better as expected. Where can you learn more? Go to http://saurabh-dian.info/. Enter A461 in the search box to learn more about \"After Your Delivery (the Postpartum Period): Care Instructions. \"  Current as of: 2017  Content Version: 11.3  © 3584-5312 [employer]. Care instructions adapted under license by [employer] (which disclaims liability or warranty for this information). If you have questions about a medical condition or this instruction, always ask your healthcare professional. [address] any warranty or liability for your use of this information.

## 2017-09-29 NOTE — PROGRESS NOTES
Pt states she had her TDAP during her pregnancy and she is waiting to get the flu. Refused FLU vaccine at this time.

## 2017-09-29 NOTE — PROGRESS NOTES
Post-Partum Day Number 2 Progress/Discharge Note    Patient doing well post-partum without significant complaint. Voiding without difficulty, normal lochia, positive flatus. Vitals:  Patient Vitals for the past 8 hrs:   BP Temp Pulse Resp   17 0714 105/61 98.3 °F (36.8 °C) 92 19     Temp (24hrs), Av.6 °F (37 °C), Min:98.3 °F (36.8 °C), Max:98.9 °F (37.2 °C)      Vital signs stable, afebrile. Exam:  Patient without distress. Abdomen soft, fundus firm at level of umbilicus, non tender               Lower extremities are negative for swelling, cords or tenderness. Lab/Data Review: All lab results for the last 24 hours reviewed. Assessment and Plan:  Patient appears to be having uncomplicated post-partum course. Continue routine perineal care and maternal education. Plan discharge for today with follow up in our office in 2 weeks.

## 2017-09-29 NOTE — PROGRESS NOTES
Report received from [facility]. RN. Plan of care discussed. Care assumed. Pt in bathroom. Family at bedside.

## 2017-09-29 NOTE — LACTATION NOTE
This note was copied from a baby's chart. In to check on feedings. Baby on phototherapy overnight due to high risk bilirubin level. Fed every 3 hours overnight per mom. Good output, weight only down 3% total. Baby currently latched on right breast in football hold and actively nursing. Good latch observed. Breasts full and firm, appears that milk is coming in well today. Observed 10 minutes and baby still feeding now. Encouraged breast massage with feeds. Continue on demand feeds, at least 8 feeds in 24 hours especially with high risk jaundice level. Will have ped appt tomorrow for recheck. Feed often and watch output closely. Mom confident, denies needs or questions.

## 2017-09-29 NOTE — DISCHARGE SUMMARY
Obstetrical Discharge Summary     Name: Augustina Clements MRN: [id number]  SSN: [id number]    YOB: 1985  Age: 36 y.o. Sex: female      Admit Date: 2017    Discharge Date: 2017     Admitting Physician: Desi Rehman MD     Attending Physician:  Hossein Whelan DO     * Admission Diagnoses: LABOR;Rupture of membranes with clear amniotic fluid;Normal*    * Discharge Diagnoses:   Information for the patient's :  Velma Chandler [[id number]]   Delivery of a 7 lb 5.5 oz (3.33 kg) female infant via Vaginal, Spontaneous Delivery on 2017 at 10:30 PM  by . Apgars were 9 and 9. Additional Diagnoses:   Hospital Problems as of 2017  Date Reviewed: 2017          Codes Class Noted - Resolved POA    Rupture of membranes with clear amniotic fluid ICD-10-CM: O42.019  ICD-9-CM: 658.10  2017 - Present Unknown        * (Principal)39 weeks gestation of pregnancy ICD-10-CM: Z3A.39  ICD-9-CM: V22.2  2017 - Present Unknown        Normal labor ICD-10-CM: O80, Z37.9  ICD-9-CM: 956  2017 - Present Unknown             Lab Results   Component Value Date/Time    ABO/Rh(D) A POSITIVE 2017 07:34 AM    Rubella, External immune 2017    ABO,Rh A Positive 2017      There is no immunization history on file for this patient. * Procedures: vaginal delivery  * No surgery found *           * Discharge Condition: good    [facility] Course: Normal hospital course following the delivery. * Disposition: Home    Discharge Medications:   Current Discharge Medication List      START taking these medications    Details   HYDROcodone-acetaminophen (HYCET) 0.5-21.7 mg/mL oral solution Take 15 mL by mouth every four (4) hours as needed. Max Daily Amount: 45 mg.  Qty: 420 mL, Refills: 0         CONTINUE these medications which have NOT CHANGED    Details   PNV COMBO#47/IRON/FA #1/DHA (PNV-DHA PO) Take  by mouth.       diphenhydrAMINE (BENADRYL) 25 mg capsule Take 25 mg by mouth every six (6) hours as needed. butalbital-acetaminophen-caffeine (FIORICET, ESGIC) -40 mg per tablet Take 1-2 tablets by oral route every 8 hours as needed for migraine  Qty: 30 Tab, Refills: 2      Cetirizine (ZYRTEC) 10 mg cap Take  by mouth. * Follow-up Care/Patient Instructions:   Activity: No sex for 6 weeks, No driving while on analgesics and No heavy lifting for 4 weeks  Diet: Regular Diet  Wound Care: Keep wound clean and dry    Follow-up Information     Follow up With Details Comments Contact Info    None   None ([phone]) Patient stated that they have no PCP             Signed By:  Annalee Lynn MD     September 29, 2017

## 2017-10-03 ENCOUNTER — HOSPITAL ENCOUNTER (EMERGENCY)
Age: 32
Discharge: HOME | End: 2017-10-04
Attending: EMERGENCY MEDICINE
Payer: COMMERCIAL

## 2017-10-03 DIAGNOSIS — N64.4 BREAST PAIN: ICD-10-CM

## 2017-10-03 DIAGNOSIS — R50.9 FEVER, UNSPECIFIED FEVER CAUSE: Primary | ICD-10-CM

## 2017-10-03 LAB
ALBUMIN SERPL-MCNC: 3.2 G/DL (ref 3.5–5)
ALBUMIN/GLOB SERPL: 0.7 {RATIO} (ref 1.2–3.5)
ALP SERPL-CCNC: 94 U/L (ref 50–136)
ALT SERPL-CCNC: 30 U/L (ref 12–65)
ANION GAP SERPL CALC-SCNC: 15 MMOL/L (ref 7–16)
APPEARANCE UR: ABNORMAL
AST SERPL-CCNC: 18 U/L (ref 15–37)
BACTERIA URNS QL MICRO: 0 /HPF
BASOPHILS # BLD: 0 K/UL (ref 0–0.2)
BASOPHILS NFR BLD: 0 % (ref 0–2)
BILIRUB SERPL-MCNC: 0.4 MG/DL (ref 0.2–1.1)
BILIRUB UR QL: NEGATIVE
BUN SERPL-MCNC: 10 MG/DL (ref 6–23)
CALCIUM SERPL-MCNC: 8.3 MG/DL (ref 8.3–10.4)
CASTS URNS QL MICRO: ABNORMAL /LPF
CHLORIDE SERPL-SCNC: 104 MMOL/L (ref 98–107)
CO2 SERPL-SCNC: 23 MMOL/L (ref 21–32)
COLOR UR: YELLOW
CREAT SERPL-MCNC: 1.02 MG/DL (ref 0.6–1)
DIFFERENTIAL METHOD BLD: ABNORMAL
EOSINOPHIL # BLD: 0.1 K/UL (ref 0–0.8)
EOSINOPHIL NFR BLD: 1 % (ref 0.5–7.8)
EPI CELLS #/AREA URNS HPF: ABNORMAL /HPF
ERYTHROCYTE [DISTWIDTH] IN BLOOD BY AUTOMATED COUNT: 13.9 % (ref 11.9–14.6)
FLUAV AG NPH QL IA: NEGATIVE
FLUBV AG NPH QL IA: NEGATIVE
GLOBULIN SER CALC-MCNC: 4.5 G/DL (ref 2.3–3.5)
GLUCOSE SERPL-MCNC: 149 MG/DL (ref 65–100)
GLUCOSE UR STRIP.AUTO-MCNC: NEGATIVE MG/DL
HCT VFR BLD AUTO: 30.3 % (ref 35.8–46.3)
HGB BLD-MCNC: 9.9 G/DL (ref 11.7–15.4)
HGB UR QL STRIP: ABNORMAL
IMM GRANULOCYTES # BLD: 0 K/UL (ref 0–0.5)
IMM GRANULOCYTES NFR BLD: 0.1 % (ref 0–5)
KETONES UR QL STRIP.AUTO: NEGATIVE MG/DL
LACTATE BLD-SCNC: 2.4 MMOL/L (ref 0.5–1.9)
LEUKOCYTE ESTERASE UR QL STRIP.AUTO: ABNORMAL
LYMPHOCYTES # BLD: 1.4 K/UL (ref 0.5–4.6)
LYMPHOCYTES NFR BLD: 20 % (ref 13–44)
MCH RBC QN AUTO: 27.9 PG (ref 26.1–32.9)
MCHC RBC AUTO-ENTMCNC: 32.7 G/DL (ref 31.4–35)
MCV RBC AUTO: 85.4 FL (ref 79.6–97.8)
MONOCYTES # BLD: 0.3 K/UL (ref 0.1–1.3)
MONOCYTES NFR BLD: 5 % (ref 4–12)
NEUTS SEG # BLD: 5 K/UL (ref 1.7–8.2)
NEUTS SEG NFR BLD: 74 % (ref 43–78)
NITRITE UR QL STRIP.AUTO: NEGATIVE
PH UR STRIP: 6.5 [PH] (ref 5–9)
PLATELET # BLD AUTO: 193 K/UL (ref 150–450)
PMV BLD AUTO: 9.5 FL (ref 10.8–14.1)
POTASSIUM SERPL-SCNC: 2.6 MMOL/L (ref 3.5–5.1)
PROT SERPL-MCNC: 7.7 G/DL (ref 6.3–8.2)
PROT UR STRIP-MCNC: NEGATIVE MG/DL
RBC # BLD AUTO: 3.55 M/UL (ref 4.05–5.25)
RBC #/AREA URNS HPF: ABNORMAL /HPF
SODIUM SERPL-SCNC: 142 MMOL/L (ref 136–145)
SP GR UR REFRACTOMETRY: 1.01 (ref 1–1.02)
UROBILINOGEN UR QL STRIP.AUTO: 0.2 EU/DL (ref 0.2–1)
WBC # BLD AUTO: 6.8 K/UL (ref 4.3–11.1)
WBC URNS QL MICRO: ABNORMAL /HPF

## 2017-10-03 PROCEDURE — 99284 EMERGENCY DEPT VISIT MOD MDM: CPT | Performed by: EMERGENCY MEDICINE

## 2017-10-03 PROCEDURE — 74011250636 HC RX REV CODE- 250/636: Performed by: EMERGENCY MEDICINE

## 2017-10-03 PROCEDURE — 83605 ASSAY OF LACTIC ACID: CPT

## 2017-10-03 PROCEDURE — 96374 THER/PROPH/DIAG INJ IV PUSH: CPT | Performed by: EMERGENCY MEDICINE

## 2017-10-03 PROCEDURE — 87088 URINE BACTERIA CULTURE: CPT | Performed by: EMERGENCY MEDICINE

## 2017-10-03 PROCEDURE — 87040 BLOOD CULTURE FOR BACTERIA: CPT | Performed by: EMERGENCY MEDICINE

## 2017-10-03 PROCEDURE — 81001 URINALYSIS AUTO W/SCOPE: CPT | Performed by: EMERGENCY MEDICINE

## 2017-10-03 PROCEDURE — 80053 COMPREHEN METABOLIC PANEL: CPT | Performed by: EMERGENCY MEDICINE

## 2017-10-03 PROCEDURE — 87086 URINE CULTURE/COLONY COUNT: CPT | Performed by: EMERGENCY MEDICINE

## 2017-10-03 PROCEDURE — 85025 COMPLETE CBC W/AUTO DIFF WBC: CPT | Performed by: EMERGENCY MEDICINE

## 2017-10-03 PROCEDURE — 87804 INFLUENZA ASSAY W/OPTIC: CPT | Performed by: EMERGENCY MEDICINE

## 2017-10-03 PROCEDURE — 96361 HYDRATE IV INFUSION ADD-ON: CPT | Performed by: EMERGENCY MEDICINE

## 2017-10-03 PROCEDURE — 87186 SC STD MICRODIL/AGAR DIL: CPT | Performed by: EMERGENCY MEDICINE

## 2017-10-03 RX ORDER — POTASSIUM CHLORIDE 20 MEQ/1
40 TABLET, EXTENDED RELEASE ORAL
Status: DISCONTINUED | OUTPATIENT
Start: 2017-10-03 | End: 2017-10-04 | Stop reason: CLARIF

## 2017-10-03 RX ORDER — SODIUM CHLORIDE 9 MG/ML
1000 INJECTION, SOLUTION INTRAVENOUS ONCE
Status: COMPLETED | OUTPATIENT
Start: 2017-10-03 | End: 2017-10-04

## 2017-10-03 RX ORDER — CEPHALEXIN 500 MG/1
1000 CAPSULE ORAL
Status: COMPLETED | OUTPATIENT
Start: 2017-10-03 | End: 2017-10-04

## 2017-10-03 RX ORDER — CEPHALEXIN 500 MG/1
500 CAPSULE ORAL 4 TIMES DAILY
Qty: 28 CAP | Refills: 0 | Status: SHIPPED | OUTPATIENT
Start: 2017-10-03 | End: 2017-10-04

## 2017-10-03 RX ORDER — POTASSIUM CHLORIDE 20 MEQ/1
20 TABLET, EXTENDED RELEASE ORAL
Status: DISCONTINUED | OUTPATIENT
Start: 2017-10-04 | End: 2017-10-04

## 2017-10-03 RX ORDER — POTASSIUM CHLORIDE 14.9 MG/ML
10 INJECTION INTRAVENOUS
Status: DISCONTINUED | OUTPATIENT
Start: 2017-10-04 | End: 2017-10-04

## 2017-10-03 RX ADMIN — SODIUM CHLORIDE 1000 ML: 900 INJECTION, SOLUTION INTRAVENOUS at 22:24

## 2017-10-03 RX ADMIN — SODIUM CHLORIDE 500 ML: 900 INJECTION, SOLUTION INTRAVENOUS at 22:24

## 2017-10-04 VITALS
HEART RATE: 90 BPM | RESPIRATION RATE: 16 BRPM | TEMPERATURE: 100.5 F | OXYGEN SATURATION: 99 % | WEIGHT: 150 LBS | SYSTOLIC BLOOD PRESSURE: 117 MMHG | HEIGHT: 66 IN | DIASTOLIC BLOOD PRESSURE: 80 MMHG | BODY MASS INDEX: 24.11 KG/M2

## 2017-10-04 PROCEDURE — 96365 THER/PROPH/DIAG IV INF INIT: CPT | Performed by: EMERGENCY MEDICINE

## 2017-10-04 PROCEDURE — 74011250637 HC RX REV CODE- 250/637: Performed by: EMERGENCY MEDICINE

## 2017-10-04 PROCEDURE — 74011250637 HC RX REV CODE- 250/637

## 2017-10-04 PROCEDURE — 74011250636 HC RX REV CODE- 250/636

## 2017-10-04 PROCEDURE — 74011000258 HC RX REV CODE- 258

## 2017-10-04 PROCEDURE — 96361 HYDRATE IV INFUSION ADD-ON: CPT | Performed by: EMERGENCY MEDICINE

## 2017-10-04 RX ORDER — POTASSIUM CHLORIDE 20MEQ/15ML
20 LIQUID (ML) ORAL
Status: COMPLETED | OUTPATIENT
Start: 2017-10-04 | End: 2017-10-04

## 2017-10-04 RX ORDER — CEPHALEXIN 250 MG/5ML
50 POWDER, FOR SUSPENSION ORAL 4 TIMES DAILY
Qty: 510 ML | Refills: 0 | Status: SHIPPED | OUTPATIENT
Start: 2017-10-04 | End: 2017-10-11

## 2017-10-04 RX ADMIN — CEPHALEXIN: 500 CAPSULE ORAL at 00:07

## 2017-10-04 RX ADMIN — POTASSIUM CHLORIDE 20 MEQ: 20 SOLUTION ORAL at 00:21

## 2017-10-04 RX ADMIN — CEFTRIAXONE 1 G: 1 INJECTION, POWDER, FOR SOLUTION INTRAMUSCULAR; INTRAVENOUS at 00:34

## 2017-10-04 NOTE — ED PROVIDER NOTES
Patient is a 36 y.o. female presenting with fever. The history is provided by the patient and the spouse. Fever    This is a new problem. The current episode started 3 to 5 hours ago. The problem has been gradually improving. The maximum temperature noted was 101 - 101.9 F. The temperature was taken using an oral thermometer. Associated symptoms include muscle aches. Pertinent negatives include no chest pain, no sleepiness, no diarrhea, no vomiting, no congestion, no headaches, no sore throat, no cough, no shortness of breath, no mental status change, no neck pain, no rash and no urinary symptoms. She has tried acetaminophen for the symptoms. The treatment provided moderate relief. Past Medical History:   Diagnosis Date    Abnormal Papanicolaou smear of cervix     Migraines        Past Surgical History:   Procedure Laterality Date    HX COLPOSCOPY      HX LEEP PROCEDURE      HX OTHER SURGICAL      Jaw surgery         Family History:   Problem Relation Age of Onset    Diabetes Maternal Grandmother        Social History     Social History    Marital status:      Spouse name: N/A    Number of children: N/A    Years of education: N/A     Occupational History    Not on file. Social History Main Topics    Smoking status: Never Smoker    Smokeless tobacco: Never Used    Alcohol use No    Drug use: No    Sexual activity: Yes     Partners: Male     Birth control/ protection: None     Other Topics Concern    Not on file     Social History Narrative         ALLERGIES: Review of patient's allergies indicates no known allergies. Review of Systems   Constitutional: Positive for fever. Negative for chills. HENT: Negative. Negative for congestion, ear pain, postnasal drip, rhinorrhea and sore throat. Eyes: Negative for pain and visual disturbance. Respiratory: Negative for cough, shortness of breath and wheezing. Cardiovascular: Negative for chest pain and leg swelling. Gastrointestinal: Negative. Negative for abdominal distention, abdominal pain, diarrhea and vomiting. Endocrine: Negative. Negative for polydipsia, polyphagia and polyuria. Genitourinary: Negative. Negative for difficulty urinating, flank pain and frequency. Musculoskeletal: Negative. Negative for arthralgias, myalgias and neck pain. Skin: Negative. Negative for rash. Neurological: Negative. Negative for dizziness and headaches. Hematological: Negative. Vitals:    10/03/17 2134   BP: 112/66   Pulse: (!) 107   Resp: 16   Temp: 99.2 °F (37.3 °C)   SpO2: 99%   Weight: 68 kg (150 lb)   Height: 5' 6\" (1.676 m)            Physical Exam   Constitutional: She is oriented to person, place, and time. She appears well-developed and well-nourished. Non-toxic appearance. She does not have a sickly appearance. She does not appear ill. No distress. HENT:   Head: Normocephalic and atraumatic. Cardiovascular: Intact distal pulses. Pulmonary/Chest: Effort normal.   Abdominal: Soft. Neurological: She is alert and oriented to person, place, and time. Skin: Skin is warm and dry. Psychiatric: She has a normal mood and affect. Her behavior is normal.   Nursing note and vitals reviewed. MDM  Number of Diagnoses or Management Options  Breast pain: new and requires workup  Fever, unspecified fever cause: new and requires workup  Diagnosis management comments: Patient states that she just had a vaginal delivery last week and that was uncomplicated that she went home with baby without any other special treatment. She states that she is breast-feeding and that today her breast felt more engorged  And that her left breast specifically seem to be a little bit Riley sensitive to touch and uncomfortable. She notes no significant skin changes but is still subjectively tender in the left breast.  Patient states that she is febrile and achy all over. Denies any new or increased vaginal discharge.   No new pelvic pains. Reviewed with Dr. Washington Devi and explained complaints mostly focused on pain in left breast other than fever. Possibly having developing mastitis. UA sent for culture. Asked for 1000 mg load of Keflex then started on to cover likely bacterial etiologies. Follow up in 48hrs regardless by phone. Return to ER as needed. Looks well at time of discharge.        Amount and/or Complexity of Data Reviewed  Clinical lab tests: ordered and reviewed (Results for orders placed or performed during the hospital encounter of 10/03/17  -INFLUENZA A & B AG (RAPID TEST)       Result                                            Value                         Ref Range                       Influenza A Ag                                    NEGATIVE                      NEG                             Influenza B Ag                                    NEGATIVE                      NEG                        -CBC WITH AUTOMATED DIFF       Result                                            Value                         Ref Range                       WBC                                               6.8                           4.3 - 11.1 K/uL                 RBC                                               3.55 (L)                      4.05 - 5.25 M/uL                HGB                                               9.9 (L)                       11.7 - 15.4 g/dL                HCT                                               30.3 (L)                      35.8 - 46.3 %                   MCV                                               85.4                          79.6 - 97.8 FL                  MCH                                               27.9                          26.1 - 32.9 PG                  MCHC                                              32.7                          31.4 - 35.0 g/dL                RDW                                               13.9                          11.9 - 14.6 % PLATELET                                          193                           150 - 450 K/uL                  MPV                                               9.5 (L)                       10.8 - 14.1 FL                  DF                                                AUTOMATED                                                     NEUTROPHILS                                       74                            43 - 78 %                       LYMPHOCYTES                                       20                            13 - 44 %                       MONOCYTES                                         5                             4.0 - 12.0 %                    EOSINOPHILS                                       1                             0.5 - 7.8 %                     BASOPHILS                                         0                             0.0 - 2.0 %                     IMMATURE GRANULOCYTES                             0.1                           0.0 - 5.0 %                     ABS. NEUTROPHILS                                  5.0                           1.7 - 8.2 K/UL                  ABS. LYMPHOCYTES                                  1.4                           0.5 - 4.6 K/UL                  ABS. MONOCYTES                                    0.3                           0.1 - 1.3 K/UL                  ABS. EOSINOPHILS                                  0.1                           0.0 - 0.8 K/UL                  ABS. BASOPHILS                                    0.0                           0.0 - 0.2 K/UL                  ABS. IMM.  GRANS.                                  0.0                           0.0 - 0.5 K/UL             -METABOLIC PANEL, COMPREHENSIVE       Result                                            Value                         Ref Range                       Sodium                                            142                           136 - 145 mmol/L Potassium                                         2.6 (LL)                      3.5 - 5.1 mmol/L                Chloride                                          104                           98 - 107 mmol/L                 CO2                                               23                            21 - 32 mmol/L                  Anion gap                                         15                            7 - 16 mmol/L                   Glucose                                           149 (H)                       65 - 100 mg/dL                  BUN                                               10                            6 - 23 MG/DL                    Creatinine                                        1.02 (H)                      0.6 - 1.0 MG/DL                 GFR est AA                                        >60                           >60 ml/min/1.73m2               GFR est non-AA                                    >60                           >60 ml/min/1.73m2               Calcium                                           8.3                           8.3 - 10.4 MG/DL                Bilirubin, total                                  0.4                           0.2 - 1.1 MG/DL                 ALT (SGPT)                                        30                            12 - 65 U/L                     AST (SGOT)                                        18                            15 - 37 U/L                     Alk. phosphatase                                  94                            50 - 136 U/L                    Protein, total                                    7.7                           6.3 - 8.2 g/dL                  Albumin                                           3.2 (L)                       3.5 - 5.0 g/dL                  Globulin                                          4.5 (H)                       2.3 - 3.5 g/dL                  A-G Ratio 0.7 (L)                       1.2 - 3.5                  -POC LACTIC ACID       Result                                            Value                         Ref Range                       Lactic Acid (POC)                                 2.4 (H)                       0.5 - 1.9 mmol/L           )      ED Course       Procedures

## 2017-10-04 NOTE — ED TRIAGE NOTES
Pt. C/o fever,chills,body aches, fatigued X today. Pt.is 6 days post partum; denies complications during pregnancy. Pt. States she took medication for her fever around 1800 today. Pt.face is red on inspection,denies sore throat.

## 2017-10-04 NOTE — ED NOTES
I have reviewed discharge instructions with the patient and spouse. The patient and spouse verbalized understanding. Patient left ED via Discharge Method: ambulatory to Home with spouse. Opportunity for questions and clarification provided. Patient given 1 scripts.

## 2017-10-07 LAB
BACTERIA SPEC CULT: ABNORMAL
SERVICE CMNT-IMP: ABNORMAL

## 2017-10-08 LAB
BACTERIA SPEC CULT: NORMAL
BACTERIA SPEC CULT: NORMAL
SERVICE CMNT-IMP: NORMAL
SERVICE CMNT-IMP: NORMAL

## 2017-11-16 PROBLEM — Z23 ENCOUNTER FOR IMMUNIZATION: Status: RESOLVED | Noted: 2017-09-19 | Resolved: 2017-11-16

## 2017-11-16 PROBLEM — Z34.90 PREGNANCY: Status: RESOLVED | Noted: 2017-02-23 | Resolved: 2017-11-16

## 2017-11-16 PROBLEM — Z98.890 H/O LEEP: Status: RESOLVED | Noted: 2017-02-23 | Resolved: 2017-11-16

## 2017-12-14 PROBLEM — L92.9 GRANULATION TISSUE AT OBSTETRICAL LACERATION SITE: Status: ACTIVE | Noted: 2017-12-14

## 2019-08-13 PROBLEM — L92.9 GRANULATION TISSUE AT OBSTETRICAL LACERATION SITE: Status: RESOLVED | Noted: 2017-12-14 | Resolved: 2019-08-13

## 2019-09-11 PROBLEM — O34.40 H/O LEEP (LOOP ELECTROSURGICAL EXCISION PROCEDURE) OF CERVIX COMPLICATING PREGNANCY: Status: ACTIVE | Noted: 2019-09-11

## 2019-09-11 PROBLEM — N70.11 HYDROSALPINX: Status: ACTIVE | Noted: 2019-09-11

## 2019-09-11 PROBLEM — Z98.890 H/O LEEP (LOOP ELECTROSURGICAL EXCISION PROCEDURE) OF CERVIX COMPLICATING PREGNANCY: Status: ACTIVE | Noted: 2019-09-11

## 2019-09-11 PROBLEM — Z34.90 PREGNANCY: Status: ACTIVE | Noted: 2019-09-11

## 2019-12-12 PROBLEM — Z23 ENCOUNTER FOR IMMUNIZATION: Status: ACTIVE | Noted: 2019-12-12

## 2020-03-20 ENCOUNTER — HOSPITAL ENCOUNTER (OUTPATIENT)
Age: 35
Discharge: HOME | End: 2020-03-20
Attending: OBSTETRICS & GYNECOLOGY | Admitting: OBSTETRICS & GYNECOLOGY
Payer: SELF-PAY

## 2020-03-20 VITALS
TEMPERATURE: 98.9 F | OXYGEN SATURATION: 97 % | DIASTOLIC BLOOD PRESSURE: 71 MMHG | RESPIRATION RATE: 18 BRPM | HEART RATE: 117 BPM | SYSTOLIC BLOOD PRESSURE: 113 MMHG

## 2020-03-20 PROBLEM — N93.9 VAGINAL SPOTTING: Status: ACTIVE | Noted: 2020-03-20

## 2020-03-20 LAB
GLUCOSE, GLUUPC: NORMAL
KETONES UR-MCNC: NEGATIVE MG/DL
PROT UR QL: NEGATIVE

## 2020-03-20 PROCEDURE — 81002 URINALYSIS NONAUTO W/O SCOPE: CPT | Performed by: OBSTETRICS & GYNECOLOGY

## 2020-03-20 PROCEDURE — 99282 EMERGENCY DEPT VISIT SF MDM: CPT

## 2020-03-20 NOTE — DISCHARGE INSTRUCTIONS
Patient Education        Learning About When to Call Your Doctor During Pregnancy (After 20 Weeks)  Your Care Instructions  It's common to have concerns about what might be a problem during pregnancy. Although most pregnant women don't have any serious problems, it's important to know when to call your doctor if you have certain symptoms or signs of labor. These are general suggestions. Your doctor may give you some more information about when to call. When to call your doctor (after 20 weeks)  Call 911 anytime you think you may need emergency care. For example, call if:  · You have severe vaginal bleeding. · You have sudden, severe pain in your belly. · You passed out (lost consciousness). · You have a seizure. · You see or feel the umbilical cord. · You think you are about to deliver your baby and can't make it safely to the hospital.  Call your doctor now or seek immediate medical care if:  · You have vaginal bleeding. · You have belly pain. · You have a fever. · You have symptoms of preeclampsia, such as:  ? Sudden swelling of your face, hands, or feet. ? New vision problems (such as dimness, blurring, or seeing spots). ? A severe headache. · You have a sudden release of fluid from your vagina. (You think your water broke.)  · You think that you may be in labor. This means that you've had at least 6 contractions in an hour. · You notice that your baby has stopped moving or is moving much less than normal.  · You have symptoms of a urinary tract infection. These may include:  ? Pain or burning when you urinate. ? A frequent need to urinate without being able to pass much urine. ? Pain in the flank, which is just below the rib cage and above the waist on either side of the back. ? Blood in your urine. Watch closely for changes in your health, and be sure to contact your doctor if:  · You have vaginal discharge that smells bad. · You have skin changes, such as:  ? A rash. ? Itching.   ? Yellow color to your skin. · You have other concerns about your pregnancy. If you have labor signs at 37 weeks or more  If you have signs of labor at 37 weeks or more, your doctor may tell you to call when your labor becomes more active. Symptoms of active labor include:  · Contractions that are regular. · Contractions that are less than 5 minutes apart. · Contractions that are hard to talk through. Follow-up care is a key part of your treatment and safety. Be sure to make and go to all appointments, and call your doctor if you are having problems. It's also a good idea to know your test results and keep a list of the medicines you take. Where can you learn more? Go to http://saurabh-dian.info/  Enter N531 in the search box to learn more about \"Learning About When to Call Your Doctor During Pregnancy (After 20 Weeks). \"  Current as of: May 29, 2019Content Version: 12.4  © 6007-7087 [employer]. Care instructions adapted under license by [employer] (which disclaims liability or warranty for this information). If you have questions about a medical condition or this instruction, always ask your healthcare professional. [place] [address] any warranty or liability for your use of this information.

## 2020-03-20 NOTE — PROGRESS NOTES
FETAL SURVEILLANCE TESTING SUMMARY    INDICATIONS:  decreased fetal movement    OBJECTIVE RESULTS:  Fetal heart variability: moderate  Fetal Heart Rate decelerations: none  Fetal Heart Rate accelerations: yes  Baseline FHR: 145-150 per minute  Uterine contractions: none    42 minutes of tracing reviewed    Fetal surveillance: reassuring    Elenor Diss BoydMD

## 2020-03-20 NOTE — H&P
CC: decreased fm and spotting    30 y.o. female  at 33w4d  weeks gestation who requests evaluation for spotting brb that started last night and decreased fm since last night. Severity: mild  Location:vaginal  Duration :12 hours  Modifying factors: none  Associated s/s:none    Her pregnancy issues include:     Fetal movement has been decreased since last night.     HISTORY:  OB History    Para Term  AB Living   2 1 1     1   SAB TAB Ectopic Molar Multiple Live Births           0 1      # Outcome Date GA Lbr Brandon/2nd Weight Sex Delivery Anes PTL Lv   2 Current            1 Term 17 39w3d 15:00 / 01:30 3.33 kg F Vag-Spont EPIDURAL AN N TRUDY       Past Surgical History:   Procedure Laterality Date    HX COLPOSCOPY      HX LEEP PROCEDURE      HX OTHER SURGICAL      Jaw surgery       Past Medical History:   Diagnosis Date    Abnormal Papanicolaou smear of cervix     Migraines     Use of letrozole (Femara)        No Known Allergies    Family History   Problem Relation Age of Onset    Diabetes Maternal Grandmother     No Known Problems Mother     Heart Attack Father     No Known Problems Maternal Grandfather     Pancreatic Cancer Paternal Grandmother     No Known Problems Paternal Grandfather        Social History     Socioeconomic History    Marital status:      Spouse name: Not on file    Number of children: Not on file    Years of education: Not on file    Highest education level: Not on file   Occupational History    Not on file   Social Needs    Financial resource strain: Not on file    Food insecurity     Worry: Not on file     Inability: Not on file    Transportation needs     Medical: Not on file     Non-medical: Not on file   Tobacco Use    Smoking status: Never Smoker    Smokeless tobacco: Never Used   Substance and Sexual Activity    Alcohol use: No    Drug use: No    Sexual activity: Yes     Partners: Male     Birth control/protection: None   Lifestyle    Physical activity     Days per week: Not on file     Minutes per session: Not on file    Stress: Not on file   Relationships    Social connections     Talks on phone: Not on file     Gets together: Not on file     Attends Judaism service: Not on file     Active member of club or organization: Not on file     Attends meetings of clubs or organizations: Not on file     Relationship status: Not on file    Intimate partner violence     Fear of current or ex partner: Not on file     Emotionally abused: Not on file     Physically abused: Not on file     Forced sexual activity: Not on file   Other Topics Concern    Not on file   Social History Narrative    Not on file       ROS:  Negative:   negative 10 point ROS except as noted in HPI    Positive:   per hpi    PHYSICAL EXAM:  Blood pressure 113/71, pulse (!) 117, temperature 98.9 °F (37.2 °C), resp. rate 18, last menstrual period 07/10/2019, SpO2 97 %, not currently breastfeeding. The patient appears well, alert, oriented x 3. Appropriate affect. Lungs are clear. Heart RRR, no murmurs. Abdomen soft, non-tender, no rebound/guarding, normoactive bs. Fundus soft and non tender  Skin warm, dry, no rashes  Ext no edema, DTR's normal    Cervix: long/closed/high/posterior    Fetal asssessment: see nst note     Tests performed and reviwed:   UA: glu 100 o/w neg      I have personally reviewed the patient's history, prenatal record, and pertinent test results. vital sign trends, laboratory results, previous provider notes support my clinical impression. Assessment:  30 y.o. female at 33w4d  reassuring fetal testing, no s/s of  labor    Plan:  Findings and test results were discussed. D/c home.     Signed By:  Darlene Gutierrez MD     2020

## 2020-03-20 NOTE — PROGRESS NOTES
Pt presents with complaints of cramping and some spotting. Baby is also decreased in movement. EFM applied.

## 2020-04-14 PROBLEM — B95.1 POSITIVE GBS TEST: Status: ACTIVE | Noted: 2020-04-14

## 2020-04-23 PROBLEM — N93.9 VAGINAL SPOTTING: Status: RESOLVED | Noted: 2020-03-20 | Resolved: 2020-04-23

## 2020-04-29 ENCOUNTER — ANESTHESIA (OUTPATIENT)
Dept: LABOR AND DELIVERY | Age: 35
End: 2020-04-29
Payer: COMMERCIAL

## 2020-04-29 ENCOUNTER — HOSPITAL ENCOUNTER (INPATIENT)
Age: 35
LOS: 2 days | Discharge: HOME | End: 2020-05-01
Attending: OBSTETRICS & GYNECOLOGY | Admitting: OBSTETRICS & GYNECOLOGY
Payer: COMMERCIAL

## 2020-04-29 ENCOUNTER — ANESTHESIA EVENT (OUTPATIENT)
Dept: LABOR AND DELIVERY | Age: 35
End: 2020-04-29
Payer: COMMERCIAL

## 2020-04-29 DIAGNOSIS — Z37.9 NORMAL LABOR: Primary | ICD-10-CM

## 2020-04-29 LAB
ABO + RH BLD: NORMAL
ALBUMIN SERPL-MCNC: 3.2 G/DL (ref 3.5–5)
ALBUMIN/GLOB SERPL: 0.8 {RATIO} (ref 1.2–3.5)
ALP SERPL-CCNC: 196 U/L (ref 50–136)
ALT SERPL-CCNC: 19 U/L (ref 12–65)
ANION GAP SERPL CALC-SCNC: 10 MMOL/L (ref 7–16)
ARTERIAL PATENCY WRIST A: ABNORMAL
ARTERIAL PATENCY WRIST A: ABNORMAL
AST SERPL-CCNC: 24 U/L (ref 15–37)
BASE DEFICIT BLD-SCNC: 3 MMOL/L
BASE DEFICIT BLD-SCNC: 5 MMOL/L
BASOPHILS # BLD: 0.1 K/UL (ref 0–0.2)
BASOPHILS NFR BLD: 1 % (ref 0–2)
BDY SITE: ABNORMAL
BDY SITE: ABNORMAL
BILIRUB SERPL-MCNC: 0.5 MG/DL (ref 0.2–1.1)
BLOOD GROUP ANTIBODIES SERPL: NORMAL
BUN SERPL-MCNC: 7 MG/DL (ref 6–23)
CA-I BLD-MCNC: 1.54 MMOL/L (ref 1.12–1.32)
CA-I BLD-MCNC: 1.57 MMOL/L (ref 1.12–1.32)
CALCIUM SERPL-MCNC: 8.6 MG/DL (ref 8.3–10.4)
CHLORIDE SERPL-SCNC: 107 MMOL/L (ref 98–107)
CO2 SERPL-SCNC: 19 MMOL/L (ref 21–32)
COLLECT TIME,HTIME: 1716
COLLECT TIME,HTIME: 1716
CREAT SERPL-MCNC: 0.62 MG/DL (ref 0.6–1)
DIFFERENTIAL METHOD BLD: ABNORMAL
EOSINOPHIL # BLD: 0.1 K/UL (ref 0–0.8)
EOSINOPHIL NFR BLD: 1 % (ref 0.5–7.8)
ERYTHROCYTE [DISTWIDTH] IN BLOOD BY AUTOMATED COUNT: 17.2 % (ref 11.9–14.6)
GAS FLOW.O2 O2 DELIVERY SYS: ABNORMAL L/MIN
GAS FLOW.O2 O2 DELIVERY SYS: ABNORMAL L/MIN
GLOBULIN SER CALC-MCNC: 4.1 G/DL (ref 2.3–3.5)
GLUCOSE BLD STRIP.AUTO-MCNC: 68 MG/DL (ref 65–100)
GLUCOSE BLD STRIP.AUTO-MCNC: 81 MG/DL (ref 65–100)
GLUCOSE SERPL-MCNC: 75 MG/DL (ref 65–100)
HCO3 BLD-SCNC: 22.9 MMOL/L (ref 22–26)
HCO3 BLD-SCNC: 22.9 MMOL/L (ref 22–26)
HCT VFR BLD AUTO: 34.9 % (ref 35.8–46.3)
HGB BLD-MCNC: 12.3 G/DL (ref 11.7–15.4)
IMM GRANULOCYTES # BLD AUTO: 0.1 K/UL (ref 0–0.5)
IMM GRANULOCYTES NFR BLD AUTO: 1 % (ref 0–5)
LYMPHOCYTES # BLD: 2.3 K/UL (ref 0.5–4.6)
LYMPHOCYTES NFR BLD: 19 % (ref 13–44)
MCH RBC QN AUTO: 32.6 PG (ref 26.1–32.9)
MCHC RBC AUTO-ENTMCNC: 35.2 G/DL (ref 31.4–35)
MCV RBC AUTO: 92.6 FL (ref 79.6–97.8)
MONOCYTES # BLD: 0.9 K/UL (ref 0.1–1.3)
MONOCYTES NFR BLD: 7 % (ref 4–12)
NEUTS SEG # BLD: 8.7 K/UL (ref 1.7–8.2)
NEUTS SEG NFR BLD: 72 % (ref 43–78)
NRBC # BLD: 0 K/UL (ref 0–0.2)
PCO2 BLD: 42.8 MMHG (ref 35–45)
PCO2 BLD: 53.1 MMHG (ref 35–45)
PH BLD: 7.24 [PH] (ref 7.35–7.45)
PH BLD: 7.34 [PH] (ref 7.35–7.45)
PLATELET # BLD AUTO: 183 K/UL (ref 150–450)
PMV BLD AUTO: 11.1 FL (ref 9.4–12.3)
PO2 BLD: 15 MMHG (ref 75–100)
PO2 BLD: 22 MMHG (ref 75–100)
POTASSIUM BLD-SCNC: 4.6 MMOL/L (ref 3.5–5.1)
POTASSIUM BLD-SCNC: 4.7 MMOL/L (ref 3.5–5.1)
POTASSIUM SERPL-SCNC: 3.6 MMOL/L (ref 3.5–5.1)
PROT SERPL-MCNC: 7.3 G/DL (ref 6.3–8.2)
RBC # BLD AUTO: 3.77 M/UL (ref 4.05–5.2)
SAO2 % BLD: 18 % (ref 95–98)
SAO2 % BLD: 29 % (ref 95–98)
SERVICE CMNT-IMP: ABNORMAL
SERVICE CMNT-IMP: ABNORMAL
SODIUM BLD-SCNC: 138 MMOL/L (ref 136–145)
SODIUM BLD-SCNC: 139 MMOL/L (ref 136–145)
SODIUM SERPL-SCNC: 136 MMOL/L (ref 136–145)
SPECIMEN EXP DATE BLD: NORMAL
SPECIMEN TYPE: ABNORMAL
SPECIMEN TYPE: ABNORMAL
WBC # BLD AUTO: 12 K/UL (ref 4.3–11.1)

## 2020-04-29 PROCEDURE — 77030011943

## 2020-04-29 PROCEDURE — 76060000078 HC EPIDURAL ANESTHESIA

## 2020-04-29 PROCEDURE — 00HU33Z INSERTION OF INFUSION DEVICE INTO SPINAL CANAL, PERCUTANEOUS APPROACH: ICD-10-PCS | Performed by: ANESTHESIOLOGY

## 2020-04-29 PROCEDURE — 82947 ASSAY GLUCOSE BLOOD QUANT: CPT

## 2020-04-29 PROCEDURE — 86900 BLOOD TYPING SEROLOGIC ABO: CPT

## 2020-04-29 PROCEDURE — 99284 EMERGENCY DEPT VISIT MOD MDM: CPT

## 2020-04-29 PROCEDURE — 74011250637 HC RX REV CODE- 250/637: Performed by: OBSTETRICS & GYNECOLOGY

## 2020-04-29 PROCEDURE — 77030011945 HC CATH URIN INT ST MENT -A

## 2020-04-29 PROCEDURE — 0KQM0ZZ REPAIR PERINEUM MUSCLE, OPEN APPROACH: ICD-10-PCS | Performed by: OBSTETRICS & GYNECOLOGY

## 2020-04-29 PROCEDURE — 82803 BLOOD GASES ANY COMBINATION: CPT

## 2020-04-29 PROCEDURE — 74011250636 HC RX REV CODE- 250/636: Performed by: REGISTERED NURSE

## 2020-04-29 PROCEDURE — 74011250636 HC RX REV CODE- 250/636: Performed by: OBSTETRICS & GYNECOLOGY

## 2020-04-29 PROCEDURE — 4A1HXCZ MONITORING OF PRODUCTS OF CONCEPTION, CARDIAC RATE, EXTERNAL APPROACH: ICD-10-PCS | Performed by: OBSTETRICS & GYNECOLOGY

## 2020-04-29 PROCEDURE — 77030014125 HC TY EPDRL BBMI -B: Performed by: ANESTHESIOLOGY

## 2020-04-29 PROCEDURE — 75410000003 HC RECOV DEL/VAG/CSECN EA 0.5 HR

## 2020-04-29 PROCEDURE — 74011000250 HC RX REV CODE- 250: Performed by: REGISTERED NURSE

## 2020-04-29 PROCEDURE — 85025 COMPLETE CBC W/AUTO DIFF WBC: CPT

## 2020-04-29 PROCEDURE — A4300 CATH IMPL VASC ACCESS PORTAL: HCPCS | Performed by: ANESTHESIOLOGY

## 2020-04-29 PROCEDURE — 77030003028 HC SUT VCRL J&J -A

## 2020-04-29 PROCEDURE — 65270000029 HC RM PRIVATE

## 2020-04-29 PROCEDURE — 77030018846 HC SOL IRR STRL H20 ICUM -A

## 2020-04-29 PROCEDURE — 75410000000 HC DELIVERY VAGINAL/SINGLE

## 2020-04-29 PROCEDURE — 75410000002 HC LABOR FEE PER 1 HR

## 2020-04-29 PROCEDURE — 80053 COMPREHEN METABOLIC PANEL: CPT

## 2020-04-29 PROCEDURE — 74011000258 HC RX REV CODE- 258: Performed by: OBSTETRICS & GYNECOLOGY

## 2020-04-29 PROCEDURE — 82330 ASSAY OF CALCIUM: CPT

## 2020-04-29 PROCEDURE — 59025 FETAL NON-STRESS TEST: CPT

## 2020-04-29 RX ORDER — DOCUSATE SODIUM 50 MG/5ML
100 LIQUID ORAL DAILY
Status: DISCONTINUED | OUTPATIENT
Start: 2020-04-30 | End: 2020-04-29

## 2020-04-29 RX ORDER — SODIUM CHLORIDE 0.9 % (FLUSH) 0.9 %
5-40 SYRINGE (ML) INJECTION EVERY 8 HOURS
Status: DISCONTINUED | OUTPATIENT
Start: 2020-04-29 | End: 2020-04-29

## 2020-04-29 RX ORDER — IBUPROFEN 800 MG/1
800 TABLET ORAL EVERY 6 HOURS
Status: DISCONTINUED | OUTPATIENT
Start: 2020-04-29 | End: 2020-04-29

## 2020-04-29 RX ORDER — ACETAMINOPHEN 325 MG/1
650 TABLET ORAL
Status: DISCONTINUED | OUTPATIENT
Start: 2020-04-29 | End: 2020-04-29

## 2020-04-29 RX ORDER — DIPHENHYDRAMINE HCL 25 MG
25 CAPSULE ORAL
Status: DISCONTINUED | OUTPATIENT
Start: 2020-04-29 | End: 2020-05-01 | Stop reason: HOSPADM

## 2020-04-29 RX ORDER — SIMETHICONE 80 MG
80 TABLET,CHEWABLE ORAL
Status: DISCONTINUED | OUTPATIENT
Start: 2020-04-29 | End: 2020-05-01 | Stop reason: HOSPADM

## 2020-04-29 RX ORDER — BUTORPHANOL TARTRATE 2 MG/ML
1 INJECTION INTRAMUSCULAR; INTRAVENOUS
Status: DISCONTINUED | OUTPATIENT
Start: 2020-04-29 | End: 2020-04-29 | Stop reason: HOSPADM

## 2020-04-29 RX ORDER — OXYTOCIN/RINGER'S LACTATE 30/500 ML
2 PLASTIC BAG, INJECTION (ML) INTRAVENOUS
Status: DISCONTINUED | OUTPATIENT
Start: 2020-04-29 | End: 2020-04-29

## 2020-04-29 RX ORDER — LIDOCAINE HYDROCHLORIDE 20 MG/ML
JELLY TOPICAL
Status: DISCONTINUED | OUTPATIENT
Start: 2020-04-29 | End: 2020-04-29 | Stop reason: HOSPADM

## 2020-04-29 RX ORDER — OXYTOCIN/RINGER'S LACTATE 30/500 ML
250 PLASTIC BAG, INJECTION (ML) INTRAVENOUS ONCE
Status: DISCONTINUED | OUTPATIENT
Start: 2020-04-29 | End: 2020-04-29

## 2020-04-29 RX ORDER — SODIUM CHLORIDE 0.9 % (FLUSH) 0.9 %
5-40 SYRINGE (ML) INJECTION AS NEEDED
Status: DISCONTINUED | OUTPATIENT
Start: 2020-04-29 | End: 2020-04-29

## 2020-04-29 RX ORDER — MISOPROSTOL 200 UG/1
200 TABLET ORAL EVERY 4 HOURS
Status: COMPLETED | OUTPATIENT
Start: 2020-04-30 | End: 2020-04-30

## 2020-04-29 RX ORDER — DOCUSATE SODIUM 50 MG/5ML
100 LIQUID ORAL 2 TIMES DAILY
Status: DISCONTINUED | OUTPATIENT
Start: 2020-04-29 | End: 2020-05-01 | Stop reason: HOSPADM

## 2020-04-29 RX ORDER — DEXTROSE, SODIUM CHLORIDE, SODIUM LACTATE, POTASSIUM CHLORIDE, AND CALCIUM CHLORIDE 5; .6; .31; .03; .02 G/100ML; G/100ML; G/100ML; G/100ML; G/100ML
125 INJECTION, SOLUTION INTRAVENOUS CONTINUOUS
Status: DISCONTINUED | OUTPATIENT
Start: 2020-04-29 | End: 2020-04-29

## 2020-04-29 RX ORDER — MINERAL OIL
120 OIL (ML) ORAL
Status: COMPLETED | OUTPATIENT
Start: 2020-04-29 | End: 2020-04-29

## 2020-04-29 RX ORDER — ONDANSETRON 4 MG/1
4 TABLET, ORALLY DISINTEGRATING ORAL
Status: ACTIVE | OUTPATIENT
Start: 2020-04-29 | End: 2020-04-30

## 2020-04-29 RX ORDER — DOCUSATE SODIUM 100 MG/1
100 CAPSULE, LIQUID FILLED ORAL 2 TIMES DAILY
Status: DISCONTINUED | OUTPATIENT
Start: 2020-04-29 | End: 2020-04-29

## 2020-04-29 RX ORDER — ROPIVACAINE HYDROCHLORIDE 2 MG/ML
INJECTION, SOLUTION EPIDURAL; INFILTRATION; PERINEURAL AS NEEDED
Status: DISCONTINUED | OUTPATIENT
Start: 2020-04-29 | End: 2020-05-04 | Stop reason: HOSPADM

## 2020-04-29 RX ORDER — OXYCODONE HYDROCHLORIDE 5 MG/1
5 TABLET ORAL
Status: DISCONTINUED | OUTPATIENT
Start: 2020-04-29 | End: 2020-04-29

## 2020-04-29 RX ORDER — OXYCODONE HCL 5 MG/5 ML
5 SOLUTION, ORAL ORAL
Status: DISCONTINUED | OUTPATIENT
Start: 2020-04-29 | End: 2020-05-01 | Stop reason: HOSPADM

## 2020-04-29 RX ORDER — ROPIVACAINE HYDROCHLORIDE 2 MG/ML
INJECTION, SOLUTION EPIDURAL; INFILTRATION; PERINEURAL
Status: DISCONTINUED | OUTPATIENT
Start: 2020-04-29 | End: 2020-05-04 | Stop reason: HOSPADM

## 2020-04-29 RX ORDER — TRIPROLIDINE/PSEUDOEPHEDRINE 2.5MG-60MG
800 TABLET ORAL
Status: DISCONTINUED | OUTPATIENT
Start: 2020-04-29 | End: 2020-05-01 | Stop reason: HOSPADM

## 2020-04-29 RX ORDER — EPHEDRINE SULFATE/0.9% NACL/PF 50 MG/5 ML
SYRINGE (ML) INTRAVENOUS AS NEEDED
Status: DISCONTINUED | OUTPATIENT
Start: 2020-04-29 | End: 2020-05-04 | Stop reason: HOSPADM

## 2020-04-29 RX ORDER — ONDANSETRON 2 MG/ML
4 INJECTION INTRAMUSCULAR; INTRAVENOUS
Status: DISCONTINUED | OUTPATIENT
Start: 2020-04-29 | End: 2020-04-29 | Stop reason: HOSPADM

## 2020-04-29 RX ORDER — LIDOCAINE HYDROCHLORIDE 10 MG/ML
1 INJECTION INFILTRATION; PERINEURAL
Status: DISCONTINUED | OUTPATIENT
Start: 2020-04-29 | End: 2020-04-29 | Stop reason: HOSPADM

## 2020-04-29 RX ADMIN — SODIUM CHLORIDE 5 MILLION UNITS: 900 INJECTION, SOLUTION INTRAVENOUS at 13:08

## 2020-04-29 RX ADMIN — ROPIVACAINE HYDROCHLORIDE 8 ML/HR: 2 INJECTION, SOLUTION EPIDURAL; INFILTRATION at 13:52

## 2020-04-29 RX ADMIN — WITCH HAZEL 1 PAD: 500 SOLUTION RECTAL; TOPICAL at 20:02

## 2020-04-29 RX ADMIN — SODIUM CHLORIDE 2.5 MILLION UNITS: 900 INJECTION, SOLUTION INTRAVENOUS at 16:21

## 2020-04-29 RX ADMIN — Medication 2 MILLI-UNITS/MIN: at 14:00

## 2020-04-29 RX ADMIN — MISOPROSTOL 200 MCG: 200 TABLET ORAL at 23:55

## 2020-04-29 RX ADMIN — Medication 10 ML: at 13:52

## 2020-04-29 RX ADMIN — IBUPROFEN 800 MG: 200 SUSPENSION ORAL at 21:00

## 2020-04-29 RX ADMIN — SODIUM CHLORIDE, SODIUM LACTATE, POTASSIUM CHLORIDE, AND CALCIUM CHLORIDE 500 ML: 600; 310; 30; 20 INJECTION, SOLUTION INTRAVENOUS at 13:30

## 2020-04-29 RX ADMIN — Medication 10 MG: at 13:58

## 2020-04-29 RX ADMIN — MINERAL OIL 120 ML: 471.95 OIL ORAL at 17:02

## 2020-04-29 RX ADMIN — Medication 10 MG: at 13:56

## 2020-04-29 RX ADMIN — DOCUSATE SODIUM 100 MG: 50 LIQUID ORAL at 21:00

## 2020-04-29 RX ADMIN — SODIUM CHLORIDE, SODIUM LACTATE, POTASSIUM CHLORIDE, CALCIUM CHLORIDE, AND DEXTROSE MONOHYDRATE 125 ML/HR: 600; 310; 30; 20; 5 INJECTION, SOLUTION INTRAVENOUS at 12:29

## 2020-04-29 RX ADMIN — Medication 1 SPRAY: at 21:44

## 2020-04-29 NOTE — PROGRESS NOTES
Bedside report from [facility], RN and care of pt assumed. Dr Cecy Huynh at the bedside and AROM.

## 2020-04-29 NOTE — PROGRESS NOTES
Labor Progress Note  Patient seen, fetal heart rate and contraction pattern evaluated, patient examined. No data found. Physical Exam:  Cervical Exam:  5 cm dilated    100% effaced    -2 station    Membranes:  Artificial Rupture of Membranes;  Amniotic Fluid: medium amount of clear fluid  Uterine Activity: irregular  Fetal Heart Rate: reassuring    Assessment/Plan:  Reassuring fetal status, will start augmentation

## 2020-04-29 NOTE — PROGRESS NOTES
13:08 Medication New Bag penicillin G potassium (PFIZERPEN) 5 Million Units in 0.9% sodium chloride (MBP/ADV) 100 mL -  Dose: 5 Million Units ; Rate: 200 mL/hr ; Route: IntraVENous ; Line: Peripheral IV 04/29/20 Right Hand ; Scheduled Time: 1300

## 2020-04-29 NOTE — PROGRESS NOTES
Epidural cath removed, tip intact. Pt up to the bathroom, gait steady. Pt unable to void at this time. Pericare done.

## 2020-04-29 NOTE — PROGRESS NOTES
Gurjit Velez at bedside at [phone]. MONALISA Rincon at bedside at 1341    Assisted pt to sitting up on bedside at 1340. Timeout completed at [phone] with MD, MONALISA and myself at bedside. Test dose given at 1347. Negative reaction. Dose given at 1351. Pt assisted to lying back in left  tilt position. See anesthesia record for details. See vital sign flow sheet for BP. Tolerated procedure well.

## 2020-04-29 NOTE — L&D DELIVERY NOTE
Delivery Summary    Patient: Jefferson Moore MRN: [id number]  SSN: [id number]    YOB: 1985  Age: 30 y.o. Sex: female       Information for the patient's :  Ronni Cruz [[id number]]       Labor Events:    Labor:      Steroids: None   Cervical Ripening Date/Time:       Cervical Ripening Type: None   Antibiotics During Labor: Yes   Rupture Identifier:      Rupture Date/Time: 2020 1:14 PM   Rupture Type: AROM   Amniotic Fluid Volume: Moderate    Amniotic Fluid Description: Meconium    Amniotic Fluid Odor:      Induction: None       Induction Date/Time:        Indications for Induction:      Augmentation: Oxytocin   Augmentation Date/Time:      Indications for Augmentation: Ineffective Contraction Pattern   Labor complications: None       Additional complications:        Delivery Events:  Indications For Episiotomy:     Episiotomy: None   Perineal Laceration(s): 2nd   Repaired: Yes   Periurethral Laceration Location:      Repaired:     Labial Laceration Location:     Repaired:     Sulcal Laceration Location:     Repaired:     Vaginal Laceration Location:     Repaired:     Cervical Laceration Location:     Repaired:     Repair Suture: Vicryl 2-0;Vicryl 3-0   Number of Repair Packets: 2   Estimated Blood Loss (ml):  ml   Quantitative Blood Loss (ml)                Delivery Date: 2020    Delivery Time: 5:16 PM  Delivery Type: Vaginal, Spontaneous  Sex:  Male    Gestational Age: 44w2d   Delivery Clinician: Ramon Torres  Living Status: Living   Delivery Location: L&D 428          APGARS  One minute Five minutes Ten minutes   Skin color: 1   2        Heart rate: 2   2        Grimace: 2   2        Muscle tone: 2   2        Breathin   2        Totals: 9   10            Presentation: Vertex    Position: Right Occiput Anterior  Resuscitation Method:  Suctioning-bulb; Tactile Stimulation     Meconium Stained:  Thin      Cord Information: 3 Vessels  Complications: None  Cord around:    Delayed cord clamping? Yes  Cord clamped date/time:   Disposition of Cord Blood: Lab    Blood Gases Sent?: Yes    Placenta:  Date/Time: 2020  5:22 PM  Removal: Spontaneous      Appearance: Normal;Intact      Measurements:  Birth Weight: 10 lb 5.8 oz (4.7 kg)      Birth Length: 1' 9.46\" (0.545 m)      Head Circumference: 1' 2.96\" (0.38 m)      Chest Circumference: 1' 2.96\" (0.38 m)     Abdominal Girth: Other Providers:   Xenia TELLEZ;;;;;;;;Elisa VALLE, Obstetrician;Primary Nurse;Primary [place] Nurse;Nicu Nurse;Neonatologist;Anesthesiologist;Crna;Nurse Practitioner;Midwife; Charge Nurse           Group B Strep: No results found for: GRBSEXT, GRBSEXT  Information for the patient's :  Jamie Snow [[id number]]   No results found for: ABORH, PCTABR, PCTDIG, BILI, ABORHEXT, ABORH    Recent Labs     20  1731   HCO3I 22.9  22.9   SO2I 18*  29*   IBD 3  5   SPECTI VENOUS CORD  ARTERIAL CORD   ISITE CORD  CORD   IDEV OTHER  OTHER   IALLEN NOT APPLICABLE  NOT APPLICABLE

## 2020-04-29 NOTE — ANESTHESIA PROCEDURE NOTES
Epidural Block    Start time: 4/29/2020 1:43 PM  End time: 4/29/2020 1:52 PM  Performed by: Yo Guaman MD  Authorized by: Yo Guaman MD     Pre-Procedure  Indication: labor epidural    Preanesthetic Checklist: patient identified, risks and benefits discussed, anesthesia consent, site marked, patient being monitored, timeout performed and anesthesia consent    Timeout Time: 13:43        Epidural:   Patient position:  Seated  Prep region:  Lumbar  Prep: Patient draped and Chlorhexidine    Location:  L3-4    Needle and Epidural Catheter:   Needle Type:  Tuohy  Needle Gauge:  17 G  Injection Technique:  Loss of resistance using saline  Attempts:  1  Catheter Size:  19 G  Catheter at Skin Depth (cm):  10  Depth in Epidural Space (cm):  8  Events: no blood with aspiration, no cerebrospinal fluid with aspiration, no paresthesia and negative aspiration test    Test Dose:  Lidocaine 1.5% w/ epi and negative    Assessment:   Catheter Secured:  Tegaderm and tape  Insertion:  Uncomplicated  Patient tolerance:  Patient tolerated the procedure well with no immediate complications

## 2020-04-29 NOTE — PROGRESS NOTES
Discussed with patient that contractions have slowed and recommend augmentation of labor will plan pitocin and AROM when IV started. Questions answered and she agrees to proceed.

## 2020-04-29 NOTE — PROGRESS NOTES
Pt to JANNETH for c/o painful contractions through the night. Reports they have slowed down and not as painful now. EFM/TOCO applied. Dr. Vince Duncan to see.

## 2020-04-29 NOTE — ANESTHESIA PREPROCEDURE EVALUATION
Anesthetic History   No history of anesthetic complications            Review of Systems / Medical History  Patient summary reviewed and pertinent labs reviewed    Pulmonary  Within defined limits                 Neuro/Psych         Headaches     Cardiovascular                  Exercise tolerance: >4 METS  Comments: Denies CV history   GI/Hepatic/Renal  Within defined limits              Endo/Other  Within defined limits           Other Findings              Physical Exam    Airway  Mallampati: II  TM Distance: 4 - 6 cm  Neck ROM: normal range of motion   Mouth opening: Normal     Cardiovascular    Rhythm: regular  Rate: normal         Dental  No notable dental hx       Pulmonary  Breath sounds clear to auscultation               Abdominal  GI exam deferred    Pertinent negatives:Ostomy present: gravid.    Other Findings            Anesthetic Plan    ASA: 2  Anesthesia type: epidural            Anesthetic plan and risks discussed with: Patient and Spouse

## 2020-04-29 NOTE — H&P
History & Physical    Name: Makeda De Leon MRN: [id number]  SSN: [id number]    YOB: 1985  Age: 30 y.o. Sex: female        Subjective: Pt is 31 y/o  at 39w2d who presents with painful uterine ctx every 4-5 minutes for the last 12 hours. There are no ameliorating or exacerbating factors. Pt notes good FM. She denies VB, LOF, UTI or PEC symptoms. She denies any symptoms of or exposure to the COVID-19 virus. Pt states that her prenatal course has been unremarkable. Estimated Date of Delivery: 20  OB History    Para Term  AB Living   2 1 1     1   SAB TAB Ectopic Molar Multiple Live Births           0 1      # Outcome Date GA Lbr Brandon/2nd Weight Sex Delivery Anes PTL Lv   2 Current            1 Term 17 39w3d 15:00 / 01:30 3.33 kg F Vag-Spont EPIDURAL AN N TRUDY        Please see prenatal records for details. Past Medical History:   Diagnosis Date    Abnormal Papanicolaou smear of cervix     Migraines     Use of letrozole (Femara)      Past Surgical History:   Procedure Laterality Date    HX COLPOSCOPY      HX LEEP PROCEDURE      HX OTHER SURGICAL      Jaw surgery     Social History     Occupational History    Not on file   Tobacco Use    Smoking status: Never Smoker    Smokeless tobacco: Never Used   Substance and Sexual Activity    Alcohol use: No    Drug use: No    Sexual activity: Yes     Partners: Male     Birth control/protection: None     Family History   Problem Relation Age of Onset    Diabetes Maternal Grandmother     No Known Problems Mother     Heart Attack Father     No Known Problems Maternal Grandfather     Pancreatic Cancer Paternal Grandmother     No Known Problems Paternal Grandfather        No Known Allergies  Prior to Admission medications    Medication Sig Start Date End Date Taking? Authorizing Provider   loratadine (Claritin) 10 mg tablet Take 10 mg by mouth.     Provider, Historical   diphenhydrAMINE (BenadryL) 25 mg capsule Take 25 mg by mouth every six (6) hours as needed. Provider, Historical   acetaminophen (TYLENOL) 325 mg tablet Take  by mouth every four (4) hours as needed for Pain. Provider, Historical   ondansetron hcl (ZOFRAN) 8 mg tablet Take 1 Tab by mouth every eight (8) hours as needed for Nausea. Indications: Excessive Vomiting in Pregnancy 10/23/19   Beatriz Henley MD   PNV QJ.01/ELIDIA fum/folic ac (PRENATAL PO) Take  by mouth daily. Provider, Historical        Review of Systems:  Constitutional:No headache, fever  Cardiac:   No chest pain      Resp: No cough or shortness of breath     GI:   No nausea/vomiting, diarrhea  :   No dysuria  Neuro:     No vision changes, headache      Objective:     Vitals:  Vitals:    20 1101 20 1104   BP:  121/85   Pulse:  (!) 106   Resp:  18   Temp:  98.3 °F (36.8 °C)   Weight: 79.8 kg (176 lb)    Height: 5' 6\" (1.676 m)         Physical Exam:  Patient without distress. Heart: Regular rate and rhythm  Lung: clear to auscultation throughout lung fields, no wheezes, no rales, no rhonchi and normal respiratory effort  Back: costovertebral angle tenderness absent  Abdomen: soft, nontender  Fundus: soft and non tender  Cervical Exam: 4.5 cm dilated    80% effaced    -2 station    Presenting Part: cephalic  Lower Extremities:  - Edema No  Membranes:  Intact  Fetal Heart Rate: Baseline: 135 per minute  Variability: moderate  Accelerations: yes  Decelerations: none  Uterine contractions: irregular    Prenatal Labs:   Lab Results   Component Value Date/Time    Rubella, External Immune 2019    HBsAg, External Negative 2019    HIV, External Non-Reactive 2019    RPR, External Non-Reactive 2019    Gonorrhea, External neg 2017    Chlamydia, External neg 2017         Assessment/Plan:     Ms. Annabel Cordoba is a  seen with pregnancy at 39w2d for early labor. GBS is positive. Plan:     Admit for labor management.  Start IV PCN per the GBS protocol. Start IV pitocin for augmentation of labor if needed. Patient discussed with Dr. Ann Man who concurs with management.

## 2020-04-30 PROCEDURE — 74011250637 HC RX REV CODE- 250/637: Performed by: OBSTETRICS & GYNECOLOGY

## 2020-04-30 PROCEDURE — 65270000029 HC RM PRIVATE

## 2020-04-30 RX ADMIN — IBUPROFEN 800 MG: 200 SUSPENSION ORAL at 20:15

## 2020-04-30 RX ADMIN — ACETAMINOPHEN 650 MG: 325 SOLUTION ORAL at 02:20

## 2020-04-30 RX ADMIN — SIMETHICONE CHEW TAB 80 MG 80 MG: 80 TABLET ORAL at 21:40

## 2020-04-30 RX ADMIN — MISOPROSTOL 200 MCG: 200 TABLET ORAL at 08:39

## 2020-04-30 RX ADMIN — MISOPROSTOL 200 MCG: 200 TABLET ORAL at 20:15

## 2020-04-30 RX ADMIN — MISOPROSTOL 200 MCG: 200 TABLET ORAL at 03:59

## 2020-04-30 RX ADMIN — MISOPROSTOL 200 MCG: 200 TABLET ORAL at 12:09

## 2020-04-30 RX ADMIN — ACETAMINOPHEN 650 MG: 325 SOLUTION ORAL at 16:54

## 2020-04-30 RX ADMIN — DOCUSATE SODIUM 100 MG: 50 LIQUID ORAL at 08:39

## 2020-04-30 RX ADMIN — SIMETHICONE CHEW TAB 80 MG 80 MG: 80 TABLET ORAL at 12:14

## 2020-04-30 RX ADMIN — SIMETHICONE CHEW TAB 80 MG 80 MG: 80 TABLET ORAL at 16:58

## 2020-04-30 RX ADMIN — MISOPROSTOL 200 MCG: 200 TABLET ORAL at 16:54

## 2020-04-30 RX ADMIN — ACETAMINOPHEN 650 MG: 325 SOLUTION ORAL at 08:39

## 2020-04-30 RX ADMIN — IBUPROFEN 800 MG: 200 SUSPENSION ORAL at 12:09

## 2020-04-30 RX ADMIN — IBUPROFEN 800 MG: 200 SUSPENSION ORAL at 03:59

## 2020-04-30 NOTE — PROGRESS NOTES
provided education and pamphlet on [facility] Postpartum [place] Home Visit Program.  Family was undecided on need for home visit. No referral will be made at this time. Family has this 's contact information should they decide to participate in program.  Postpartum packet provided. Oppournuity for questions and comments were given. Chart screened by  for discharge planning. No needs identified at this time. Please consult  if any new issues arise.

## 2020-04-30 NOTE — PROGRESS NOTES
Primary reached out to Dr. Melvin Dougherty via [employer] related to patient's bleeding and fundus being boggy. Small trickle noted with each fundal check.

## 2020-04-30 NOTE — PROGRESS NOTES
SBAR IN Report: Mother    Verbal report received from Elsa Marion RN on this patient, who is now being transferred from L&D for routine progression of care. The patient is not wearing a green \"Anesthesia-Duramorph\" band. Report consisted of patient's Situation, Background, Assessment and Recommendations (SBAR).  ID bands were compared with the identification form, and verified with the patient and transferring nurse. Information from the SBAR and the [place] Report was reviewed with the transferring nurse; opportunity for questions and clarification provided.

## 2020-04-30 NOTE — PROGRESS NOTES
Post-Partum Day Number 1 Progress Note    Patient doing well post-partum without significant complaint. Voiding withour difficulty, normal lochia. Vitals:    Patient Vitals for the past 8 hrs:   BP Temp Pulse Resp SpO2   20 0658 103/54 98.7 °F (37.1 °C) 95 16 97 %   20 0210 105/61 98.9 °F (37.2 °C) (!) 101 16 97 %     Temp (24hrs), Av.7 °F (37.1 °C), Min:98.3 °F (36.8 °C), Max:100.3 °F (37.9 °C)      Vital signs stable, afebrile. Exam:  Patient without distress. Abdomen soft, fundus firm at level of umbilicus, nontender               Lower extremities are negative for swelling, cords or tenderness. Lab/Data Review: All lab results for the last 24 hours reviewed. Assessment and Plan:  Patient appears to be having uncomplicated post-partum course. Continue routine perineal care and maternal education. Plan discharge tomorrow if no problems occur.

## 2020-04-30 NOTE — PROGRESS NOTES
Patient up to bathroom with RN assistance. Roseanne-care taught and completed. Questions encouraged and answered. Patient ambulating without difficulty, encouraged to call for needs or concerns. Verbalizes understanding.

## 2020-04-30 NOTE — LACTATION NOTE

## 2020-04-30 NOTE — LACTATION NOTE
This note was copied from a baby's chart. Lactation visit. 2nd time mom, nursed first child x 6 months, no issues. LGA infant. Not yet 24 hours old. Has been latching and feeding very well so far per mom. Stable blood glucose. Mom describes a good latch to both breasts. No concerns or issues at present time. Offered feeding assist/observation of latch as desired by mom, mom to call out today as needed. Reviewed feeding expectations. Feed on demand. Wake as needed to ensure baby feeding every 3 hours. Has voided and stooled. Doing well.

## 2020-04-30 NOTE — PROGRESS NOTES
SBAR OUT Report: Mother    Verbal report given to SAMI Levi RN (full name & credentials) on this patient, who is now being transferred to MIU (unit) for routine progression of care. The patient is not wearing a green \"Anesthesia-Duramorph\" band. Report consisted of patient's Situation, Background, Assessment and Recommendations (SBAR). [place] ID bands were compared with the identification form, and verified with the patient and receiving nurse. Information from the SBAR, Procedure Summary, Intake/Output and MAR and the [place] Report was reviewed with the receiving nurse; opportunity for questions and clarification provided.

## 2020-04-30 NOTE — PROGRESS NOTES
Admission assessment complete as noted. Patient oriented to room and unit. Plan of care reviewed and patient verbalizes understanding. Questions encouraged and answered. Patent encouraged to call for needs or concerns. Safety Teaching reviewed:   1. Hand hygiene prior to handling the infant. 2. Use of bulb syringe. 3. Bracelets with matching numbers are placed on mother and infant  4. An infant security tag  [facility]) is placed on the infant's ankle and monitored  5. All OB nurses wear pink Employee badges - do not give your baby to anyone without proper identification. 6. Never leave the baby alone in the room. 7. The infant should be placed on their back to sleep. on a firm mattress. No toys should be placed in the crib. (safe sleep video offered to view)  8. Never shake the baby (video offered to view)  9. Infant fall prevention - do not sleep with the baby, and place the baby in the crib while ambulating. 8. Mother and Baby Care booklet given to Mother.

## 2020-04-30 NOTE — PROGRESS NOTES
04/30/20 0220   Pain Assessment   Pain Scale 1 Numeric (0 - 10)   Pain Intensity 1 3   Pain Location 1 Abdomen;Perineum   Pain Description 1 Aching;Cramping; Sore   Pain Intervention(s) 1 Medication (see MAR)     PRN Tylenol 650mg for pain

## 2020-04-30 NOTE — PROGRESS NOTES
04/30/20 0359   Pain Assessment   Pain Scale 1 Numeric (0 - 10)   Pain Intensity 1 3   Pain Location 1 Abdomen;Perineum   Pain Description 1 Aching;Cramping; Sore   Pain Intervention(s) 1 Medication (see MAR)     PRN Motrin 800mg for pain

## 2020-05-01 VITALS
BODY MASS INDEX: 28.28 KG/M2 | HEART RATE: 85 BPM | TEMPERATURE: 98.3 F | HEIGHT: 66 IN | OXYGEN SATURATION: 97 % | SYSTOLIC BLOOD PRESSURE: 98 MMHG | DIASTOLIC BLOOD PRESSURE: 60 MMHG | RESPIRATION RATE: 16 BRPM | WEIGHT: 176 LBS

## 2020-05-01 PROCEDURE — 74011250637 HC RX REV CODE- 250/637: Performed by: OBSTETRICS & GYNECOLOGY

## 2020-05-01 RX ORDER — OXYCODONE HCL 5 MG/5 ML
5 SOLUTION, ORAL ORAL
Qty: 60 ML | Refills: 0 | Status: SHIPPED | OUTPATIENT
Start: 2020-05-01 | End: 2020-05-04

## 2020-05-01 RX ORDER — TRIPROLIDINE/PSEUDOEPHEDRINE 2.5MG-60MG
400 TABLET ORAL
Qty: 473 ML | Refills: 0 | Status: SHIPPED | OUTPATIENT
Start: 2020-05-01 | End: 2021-06-07

## 2020-05-01 RX ADMIN — ACETAMINOPHEN 650 MG: 325 SOLUTION ORAL at 08:33

## 2020-05-01 RX ADMIN — IBUPROFEN 800 MG: 200 SUSPENSION ORAL at 03:43

## 2020-05-01 RX ADMIN — DOCUSATE SODIUM 100 MG: 50 LIQUID ORAL at 08:33

## 2020-05-01 RX ADMIN — IBUPROFEN 800 MG: 200 SUSPENSION ORAL at 15:40

## 2020-05-01 NOTE — DISCHARGE INSTRUCTIONS
Patient Education   Discharge instruction to follow: Activity: Pelvis rest for 6 weeks     No heavy lifting over 15 lbs for 2 weeks     No driving for 2 weeks     No push/pull motion such as sweeping or vacuuming for 2 weeks     No tub baths for 6 weeks    Continue using the hygenique wand after each void or bowel movement. If using sitz bath continue until comfortable stopping. If using rakel-bottle continue to use until comfortable stopping. Change sanitary pad after each urination or bowel movement. Call MD for the following:      Fever over 101 F; pain not relieved by medication; foul smelling vaginal discharge or an increase in vaginal bleeding. Take medication as prescribed. Follow up with MD as order. Vaginal Childbirth: Care Instructions  Your Care Instructions    Your body will slowly heal in the next few weeks. It is easy to get too tired and overwhelmed during the first weeks after your baby is born. Changes in your hormones can shift your mood without warning. You may find it hard to meet the extra demands on your energy and time. Take it easy on yourself. Follow-up care is a key part of your treatment and safety. Be sure to make and go to all appointments, and call your doctor if you are having problems. It's also a good idea to know your test results and keep a list of the medicines you take. How can you care for yourself at home? · Vaginal bleeding and cramps  ? After delivery, you will have a bloody discharge from the vagina. This will turn pink within a week and then white or yellow after about 10 days. It may last for 2 to 4 weeks or longer, until the uterus has healed. Use pads instead of tampons until you stop bleeding. ? Do not worry if you pass some blood clots, as long as they are smaller than a golf ball. If you have a tear or stitches in your vaginal area, change the pad at least every 4 hours to prevent soreness and infection. ?  You may have cramps for the first few days after childbirth. These are normal and occur as the uterus shrinks to normal size. Take an over-the-counter pain medicine, such as acetaminophen (Tylenol), ibuprofen (Advil, Motrin), or naproxen (Aleve), for cramps. Read and follow all instructions on the label. Do not take aspirin, because it can cause more bleeding. ? Do not take two or more pain medicines at the same time unless the doctor told you to. Many pain medicines have acetaminophen, which is Tylenol. Too much acetaminophen (Tylenol) can be harmful. · Stitches  ? If you have stitches, they will dissolve on their own and do not need to be removed. Follow your doctor's instructions for cleaning the stitched area. ? Put ice or a cold pack on your painful area for 10 to 20 minutes at a time, several times a day, for the first few days. Put a thin cloth between the ice and your skin. ? Sit in a few inches of warm water (sitz bath) 3 times a day and after bowel movements. The warm water helps with pain and itching. If you do not have a tub, a warm shower might help. · Breast fullness  ? Your breasts may overfill (engorge) in the first few days after delivery. To help milk flow and to relieve pain, warm your breasts in the shower or by using warm, moist towels before nursing. ? If you are not nursing, do not put warmth on your breasts or touch your breasts. Wear a tight bra or sports bra and use ice until the fullness goes away. This usually takes 2 to 3 days. ? Put ice or a cold pack on your breast after nursing to reduce swelling and pain. Put a thin cloth between the ice and your skin. · Activity  ? Eat a balanced diet. Do not try to lose weight by cutting calories. Keep taking your prenatal vitamins, or take a multivitamin. ? Get as much rest as you can. Try to take naps when your baby sleeps during the day. ? Get some exercise every day. But do not do any heavy exercise until your doctor says it is okay.   ? Wait until you are healed (about 4 to 6 weeks) before you have sexual intercourse. Your doctor will tell you when it is okay to have sex. ? Talk to your doctor about birth control. You can get pregnant even before your period returns. Also, you can get pregnant while you are breastfeeding. · Mental health  ? It is normal to have some sadness, anxiety, sleeplessness, and mood swings after you go home. If you feel upset or hopeless for more than a few days or are having trouble doing the things you need to do, talk to your doctor. · Constipation and hemorrhoids  ? Drink plenty of fluids, enough so that your urine is light yellow or clear like water. If you have kidney, heart, or liver disease and have to limit fluids, talk with your doctor before you increase the amount of fluids you drink. ? Eat plenty of fiber each day. Have a bran muffin or bran cereal for breakfast, and try eating a piece of fruit for a mid-afternoon snack. ? For painful, itchy hemorrhoids, put ice or a cold pack on the area several times a day for 10 minutes at a time. Follow this by putting a warm compress on the area for another 10 to 20 minutes or by sitting in a shallow, warm bath. When should you call for help? Call  911 anytime you think you may need emergency care. For example, call if:    · You have thoughts of harming yourself, your baby, or another person.     · You passed out (lost consciousness).     · You have chest pain, are short of breath, or cough up blood.     · You have a seizure.    Call your doctor now or seek immediate medical care if:    · You have severe vaginal bleeding.     · You are dizzy or lightheaded, or you feel like you may faint.     · You have a fever.     · You have new or more pain in your belly or pelvis.     · You have symptoms of a blood clot in your leg (called a deep vein thrombosis), such as:  ? Pain in the calf, back of the knee, thigh, or groin. ?  Redness and swelling in your leg or groin.     · You have signs of preeclampsia, such as:  ? Sudden swelling of your face, hands, or feet. ? New vision problems (such as dimness, blurring, or seeing spots). ? A severe headache.    Watch closely for changes in your health, and be sure to contact your doctor if:    · Your vaginal bleeding seems to be getting heavier.     · You have new or worse vaginal discharge.     · You feel sad, anxious, or hopeless for more than a few days.     · You do not get better as expected. Where can you learn more? Go to http://saurabh-dian.info/  Enter Q237 in the search box to learn more about \"Vaginal Childbirth: Care Instructions. \"  Current as of: May 29, 2019Content Version: 12.4  © 4631-6048 [employer]. Care instructions adapted under license by [employer] (which disclaims liability or warranty for this information). If you have questions about a medical condition or this instruction, always ask your healthcare professional. [address] any warranty or liability for your use of this information.

## 2020-05-01 NOTE — PROGRESS NOTES
05/01/20 0343   Pain Assessment   Pain Scale 1 Numeric (0 - 10)   Pain Intensity 1 1   Pain Location 1 Abdomen; Head   Pain Description 1 Aching   Pain Intervention(s) 1 Medication (see MAR)     Motrin 800mg PO for pain

## 2020-05-01 NOTE — PROGRESS NOTES
Family seen by Ramon Arevalo (RN Case Manager).     SHAYLA Ta-ALFONSO  [address] [facility]   [phone]

## 2020-05-01 NOTE — LACTATION NOTE

## 2020-05-01 NOTE — PROGRESS NOTES
Mom complains of sore perineum. Questioned mother if she has used her [de-identified] bath lately. Mother states she has not. Educated mother of benefits of sitz bath and set it up for her per her request. Educated mother to call out with any needs. Mother verbalized understanding.

## 2020-05-01 NOTE — PROGRESS NOTES
Discharge teaching complete. Mother verbalized understanding, questions encouraged. [place] sheet signed.

## 2020-05-01 NOTE — DISCHARGE SUMMARY
Post-Partum Day Number 2 Progress/Discharge Note    Patient doing well post-partum without significant complaint. Voiding without difficulty, normal lochia, positive flatus. Vitals:    Patient Vitals for the past 8 hrs:   BP Temp Pulse Resp SpO2   20 0719 98/60 98.3 °F (36.8 °C) 85 16 97 %     Temp (24hrs), Av.3 °F (36.8 °C), Min:98.3 °F (36.8 °C), Max:98.3 °F (36.8 °C)      Vital signs stable, afebrile. Exam:  Patient without distress. Abdomen soft, fundus firm at level of umbilicus, non tender               Perineum with normal lochia noted. Lower extremities are negative for swelling, cords or tenderness. Lab/Data Review: All lab results for the last 24 hours reviewed. Assessment and Plan:  Patient appears to be having uncomplicated post-partum course. Continue routine perineal care and maternal education. Plan discharge for today with follow up in our office in 6 weeks.

## 2020-05-01 NOTE — PROGRESS NOTES
04/30/20 2016   Pain Assessment   Pain Scale 1 Numeric (0 - 10)   Pain Intensity 1 2   Pain Location 1 Abdomen;Perineum   Pain Description 1 Aching; Sore   Pain Intervention(s) 1 Medication (see MAR)     PRN Motrin 800mg for pain

## 2020-05-01 NOTE — LACTATION NOTE
This note was copied from a baby's chart. Lactation visit. In to check on feeds. Baby now under phototherapy for elevated bilirubin levels. Will have repeat at 1700 and if level is lower, will discharge as scheduled. Mom feeding baby now on left breast in football hold. Baby latched well and actively feeding. Mom did mention that baby had been more sleepy. Reviewed that increased jaundice level can make baby more sleepy. Reviewed waking measures and to actively work to wake baby every 3 hours to feed. Is supplementing some with formula too. Make sure baby feeding well every 3 hours. Watch output. Mom confident and doing well. No needs or questions.

## 2020-05-09 ENCOUNTER — HOSPITAL ENCOUNTER (EMERGENCY)
Age: 35
Discharge: HOME | End: 2020-05-09
Attending: EMERGENCY MEDICINE
Payer: COMMERCIAL

## 2020-05-09 VITALS
OXYGEN SATURATION: 100 % | BODY MASS INDEX: 24.33 KG/M2 | HEIGHT: 67 IN | DIASTOLIC BLOOD PRESSURE: 64 MMHG | HEART RATE: 109 BPM | WEIGHT: 155 LBS | TEMPERATURE: 100.2 F | SYSTOLIC BLOOD PRESSURE: 110 MMHG | RESPIRATION RATE: 16 BRPM

## 2020-05-09 DIAGNOSIS — N39.0 URINARY TRACT INFECTION WITHOUT HEMATURIA, SITE UNSPECIFIED: ICD-10-CM

## 2020-05-09 DIAGNOSIS — R50.9 FEVER, UNSPECIFIED FEVER CAUSE: Primary | ICD-10-CM

## 2020-05-09 LAB
ALBUMIN SERPL-MCNC: 3.8 G/DL (ref 3.5–5)
ALBUMIN/GLOB SERPL: 0.9 {RATIO} (ref 1.2–3.5)
ALP SERPL-CCNC: 117 U/L (ref 50–130)
ALT SERPL-CCNC: 29 U/L (ref 12–65)
ANION GAP SERPL CALC-SCNC: 8 MMOL/L (ref 7–16)
AST SERPL-CCNC: 41 U/L (ref 15–37)
BACTERIA URNS QL MICRO: 0 /HPF
BASOPHILS # BLD: 0.1 K/UL (ref 0–0.2)
BASOPHILS NFR BLD: 1 % (ref 0–2)
BILIRUB SERPL-MCNC: 0.6 MG/DL (ref 0.2–1.1)
BUN SERPL-MCNC: 16 MG/DL (ref 6–23)
CALCIUM SERPL-MCNC: 8.6 MG/DL (ref 8.3–10.4)
CASTS URNS QL MICRO: NORMAL /LPF
CHLORIDE SERPL-SCNC: 105 MMOL/L (ref 98–107)
CO2 SERPL-SCNC: 24 MMOL/L (ref 21–32)
CREAT SERPL-MCNC: 0.92 MG/DL (ref 0.6–1)
DIFFERENTIAL METHOD BLD: NORMAL
EOSINOPHIL # BLD: 0.5 K/UL (ref 0–0.8)
EOSINOPHIL NFR BLD: 5 % (ref 0.5–7.8)
EPI CELLS #/AREA URNS HPF: NORMAL /HPF
ERYTHROCYTE [DISTWIDTH] IN BLOOD BY AUTOMATED COUNT: 14 % (ref 11.9–14.6)
GLOBULIN SER CALC-MCNC: 4.4 G/DL (ref 2.3–3.5)
GLUCOSE SERPL-MCNC: 81 MG/DL (ref 65–100)
HCT VFR BLD AUTO: 40.4 % (ref 35.8–46.3)
HGB BLD-MCNC: 13.7 G/DL (ref 11.7–15.4)
IMM GRANULOCYTES # BLD AUTO: 0 K/UL (ref 0–0.5)
IMM GRANULOCYTES NFR BLD AUTO: 0 % (ref 0–5)
LYMPHOCYTES # BLD: 1.4 K/UL (ref 0.5–4.6)
LYMPHOCYTES NFR BLD: 16 % (ref 13–44)
MCH RBC QN AUTO: 32.3 PG (ref 26.1–32.9)
MCHC RBC AUTO-ENTMCNC: 33.9 G/DL (ref 31.4–35)
MCV RBC AUTO: 95.3 FL (ref 79.6–97.8)
MONOCYTES # BLD: 0.6 K/UL (ref 0.1–1.3)
MONOCYTES NFR BLD: 7 % (ref 4–12)
NEUTS SEG # BLD: 6.1 K/UL (ref 1.7–8.2)
NEUTS SEG NFR BLD: 70 % (ref 43–78)
NRBC # BLD: 0 K/UL (ref 0–0.2)
PLATELET # BLD AUTO: 224 K/UL (ref 150–450)
PMV BLD AUTO: 9.5 FL (ref 9.4–12.3)
POTASSIUM SERPL-SCNC: 3.4 MMOL/L (ref 3.5–5.1)
PROT SERPL-MCNC: 8.2 G/DL (ref 6.3–8.2)
RBC # BLD AUTO: 4.24 M/UL (ref 4.05–5.2)
RBC #/AREA URNS HPF: NORMAL /HPF
SODIUM SERPL-SCNC: 137 MMOL/L (ref 136–145)
WBC # BLD AUTO: 8.7 K/UL (ref 4.3–11.1)
WBC URNS QL MICRO: NORMAL /HPF

## 2020-05-09 PROCEDURE — 81015 MICROSCOPIC EXAM OF URINE: CPT

## 2020-05-09 PROCEDURE — 87086 URINE CULTURE/COLONY COUNT: CPT

## 2020-05-09 PROCEDURE — 96374 THER/PROPH/DIAG INJ IV PUSH: CPT

## 2020-05-09 PROCEDURE — 74011250636 HC RX REV CODE- 250/636: Performed by: EMERGENCY MEDICINE

## 2020-05-09 PROCEDURE — 99284 EMERGENCY DEPT VISIT MOD MDM: CPT

## 2020-05-09 PROCEDURE — 81003 URINALYSIS AUTO W/O SCOPE: CPT

## 2020-05-09 PROCEDURE — 85025 COMPLETE CBC W/AUTO DIFF WBC: CPT

## 2020-05-09 PROCEDURE — 74011000258 HC RX REV CODE- 258: Performed by: EMERGENCY MEDICINE

## 2020-05-09 PROCEDURE — 80053 COMPREHEN METABOLIC PANEL: CPT

## 2020-05-09 RX ORDER — CEPHALEXIN 500 MG/1
500 CAPSULE ORAL 3 TIMES DAILY
Qty: 15 CAP | Refills: 0 | Status: SHIPPED | OUTPATIENT
Start: 2020-05-10 | End: 2020-05-15

## 2020-05-09 RX ADMIN — SODIUM CHLORIDE 1000 ML: 900 INJECTION, SOLUTION INTRAVENOUS at 19:39

## 2020-05-09 RX ADMIN — CEFTRIAXONE 1 G: 1 INJECTION, POWDER, FOR SOLUTION INTRAMUSCULAR; INTRAVENOUS at 21:06

## 2020-05-09 NOTE — ED TRIAGE NOTES
Patient presents today 10 days post partum of vaginal birth. States he bleeding has gotten a little heavier today. She called her OB due to fever at home of 102.5 and they told her to come to the ER to get checked. Denies any pain, SOB. Patient wearing a mask during triage and admission to ER.

## 2020-05-09 NOTE — ED PROVIDER NOTES
31-year-old healthy white female 10 days status post spontaneous vaginal delivery presents with fever up to 102.5 this morning. She states that she felt hot and flushed and this prompted her to check her temperature. She denies respiratory symptoms. Specifically no congestion or cough. No nausea, vomiting, diarrhea or dysuria. She has had some vaginal bleeding since the procedure but does not feel that it is abnormal.  No discharge. No pelvic pain. She did not take any medication for the fever this morning. Patient denies breast pain/redness to suggest mastitis. The history is provided by the patient. Fever    Pertinent negatives include no chest pain, no diarrhea, no vomiting, no congestion, no headaches, no cough, no shortness of breath, no neck pain and no rash.         Past Medical History:   Diagnosis Date    Abnormal Papanicolaou smear of cervix     Migraines     Use of letrozole (Femara)        Past Surgical History:   Procedure Laterality Date    HX COLPOSCOPY      HX LEEP PROCEDURE      HX OTHER SURGICAL      Jaw surgery         Family History:   Problem Relation Age of Onset    Diabetes Maternal Grandmother     No Known Problems Mother     Heart Attack Father     No Known Problems Maternal Grandfather     Pancreatic Cancer Paternal Grandmother     No Known Problems Paternal Grandfather        Social History     Socioeconomic History    Marital status:      Spouse name: Not on file    Number of children: Not on file    Years of education: Not on file    Highest education level: Not on file   Occupational History    Not on file   Social Needs    Financial resource strain: Not on file    Food insecurity     Worry: Not on file     Inability: Not on file    Transportation needs     Medical: Not on file     Non-medical: Not on file   Tobacco Use    Smoking status: Never Smoker    Smokeless tobacco: Never Used   Substance and Sexual Activity    Alcohol use: No    Drug use: No    Sexual activity: Yes     Partners: Male     Birth control/protection: None   Lifestyle    Physical activity     Days per week: Not on file     Minutes per session: Not on file    Stress: Not on file   Relationships    Social connections     Talks on phone: Not on file     Gets together: Not on file     Attends Anabaptist service: Not on file     Active member of club or organization: Not on file     Attends meetings of clubs or organizations: Not on file     Relationship status: Not on file    Intimate partner violence     Fear of current or ex partner: Not on file     Emotionally abused: Not on file     Physically abused: Not on file     Forced sexual activity: Not on file   Other Topics Concern     Service Not Asked    Blood Transfusions Not Asked    Caffeine Concern Not Asked    Occupational Exposure Not Asked   Vickki Kelch Hazards Not Asked    Sleep Concern Not Asked    Stress Concern Not Asked    Weight Concern Not Asked    Special Diet Not Asked    Back Care Not Asked    Exercise Not Asked    Bike Helmet Not Asked   [address] Not Asked    Self-Exams Not Asked   Social History Narrative    Not on file         ALLERGIES: Patient has no known allergies. Review of Systems   Constitutional: Positive for fever. HENT: Negative for congestion. Respiratory: Negative for cough and shortness of breath. Cardiovascular: Negative for chest pain. Gastrointestinal: Negative for abdominal pain, diarrhea, nausea and vomiting. Genitourinary: Negative for dysuria. Musculoskeletal: Negative for back pain and neck pain. Skin: Negative for rash. Neurological: Negative for headaches. Vitals:    05/09/20 1917   BP: 139/88   Pulse: (!) 115   Resp: 18   Temp: 100.2 °F (37.9 °C)   SpO2: 99%   Weight: 70.3 kg (155 lb)   Height: 5' 7\" (1.702 m)            Physical Exam  Vitals signs and nursing note reviewed. Constitutional:       General: She is not in acute distress. Appearance: Normal appearance. She is not toxic-appearing. HENT:      Head: Normocephalic and atraumatic. Nose: Nose normal. No congestion. Mouth/Throat:      Mouth: Mucous membranes are moist.      Pharynx: Oropharynx is clear. No oropharyngeal exudate or posterior oropharyngeal erythema. Eyes:      Conjunctiva/sclera: Conjunctivae normal.      Pupils: Pupils are equal, round, and reactive to light. Neck:      Musculoskeletal: Normal range of motion and neck supple. Cardiovascular:      Rate and Rhythm: Regular rhythm. Tachycardia present. Heart sounds: No murmur. Pulmonary:      Effort: Pulmonary effort is normal.      Breath sounds: Normal breath sounds. No wheezing or rales. Abdominal:      General: There is no distension. Palpations: Abdomen is soft. Tenderness: There is no abdominal tenderness. There is no guarding. Musculoskeletal: Normal range of motion. General: No swelling. Skin:     General: Skin is warm and dry. Neurological:      Mental Status: She is alert and oriented to person, place, and time. Psychiatric:         Mood and Affect: Mood normal.         Behavior: Behavior normal.          MDM  Number of Diagnoses or Management Options  Diagnosis management comments: Patient was initially evaluated by labor and delivery and was sent to the emergency department for further work-up. Patient treated with IV fluids. Blood work unremarkable. Urinalysis has  white cells 5-10 red cells. This may simply be due to the recent delivery however cannot rule out UTI at this time. We will culture the urine and start her on antibiotics.        Amount and/or Complexity of Data Reviewed  Clinical lab tests: ordered and reviewed    Risk of Complications, Morbidity, and/or Mortality  Presenting problems: moderate  Diagnostic procedures: moderate  Management options: moderate           Procedures

## 2020-05-10 NOTE — ED NOTES
I have reviewed discharge instructions with the patient. The patient verbalized understanding. Patient left ED via Discharge Method: ambulatory to Home with self. Opportunity for questions and clarification provided. Patient given 1 scripts. To continue your aftercare when you leave the hospital, you may receive an automated call from our care team to check in on how you are doing. This is a free service and part of our promise to provide the best care and service to meet your aftercare needs.  If you have questions, or wish to unsubscribe from this service please call [phone]. Thank you for Choosing our [facility] Emergency Department.

## 2020-05-10 NOTE — DISCHARGE INSTRUCTIONS

## 2020-05-12 LAB
BACTERIA SPEC CULT: NORMAL
BACTERIA SPEC CULT: NORMAL
SERVICE CMNT-IMP: NORMAL

## 2020-06-08 PROBLEM — Z98.890 H/O LEEP (LOOP ELECTROSURGICAL EXCISION PROCEDURE) OF CERVIX COMPLICATING PREGNANCY: Status: RESOLVED | Noted: 2019-09-11 | Resolved: 2020-06-08

## 2020-06-08 PROBLEM — Z37.9 NORMAL LABOR: Status: RESOLVED | Noted: 2020-04-29 | Resolved: 2020-06-08

## 2020-06-08 PROBLEM — B95.1 POSITIVE GBS TEST: Status: RESOLVED | Noted: 2020-04-14 | Resolved: 2020-06-08

## 2020-06-08 PROBLEM — O34.40 H/O LEEP (LOOP ELECTROSURGICAL EXCISION PROCEDURE) OF CERVIX COMPLICATING PREGNANCY: Status: RESOLVED | Noted: 2019-09-11 | Resolved: 2020-06-08

## 2020-06-08 PROBLEM — Z34.90 PREGNANCY: Status: RESOLVED | Noted: 2019-09-11 | Resolved: 2020-06-08

## 2021-06-07 PROBLEM — Z98.890 HISTORY OF LEEP (LOOP ELECTROSURGICAL EXCISION PROCEDURE) OF CERVIX COMPLICATING PREGNANCY: Status: ACTIVE | Noted: 2021-06-07

## 2021-06-07 PROBLEM — O09.299 HISTORY OF MACROSOMIA IN INFANT IN PRIOR PREGNANCY, CURRENTLY PREGNANT: Status: ACTIVE | Noted: 2021-06-07

## 2021-06-07 PROBLEM — O34.81 OVARIAN CYST AFFECTING PREGNANCY IN FIRST TRIMESTER, ANTEPARTUM: Status: ACTIVE | Noted: 2021-06-07

## 2021-06-07 PROBLEM — N83.209 OVARIAN CYST AFFECTING PREGNANCY IN FIRST TRIMESTER, ANTEPARTUM: Status: ACTIVE | Noted: 2021-06-07

## 2021-06-07 PROBLEM — O34.40 HISTORY OF LEEP (LOOP ELECTROSURGICAL EXCISION PROCEDURE) OF CERVIX COMPLICATING PREGNANCY: Status: ACTIVE | Noted: 2021-06-07

## 2021-06-07 PROBLEM — Z34.90 PREGNANCY: Status: ACTIVE | Noted: 2021-06-07

## 2021-06-07 PROBLEM — N70.11 HYDROSALPINX: Status: RESOLVED | Noted: 2019-09-11 | Resolved: 2021-06-07

## 2021-06-07 PROBLEM — Z86.69 HX OF MIGRAINE HEADACHES: Status: ACTIVE | Noted: 2021-06-07

## 2021-06-07 PROBLEM — Z23 ENCOUNTER FOR IMMUNIZATION: Status: RESOLVED | Noted: 2019-12-12 | Resolved: 2021-06-07

## 2021-06-27 ENCOUNTER — HOSPITAL ENCOUNTER (EMERGENCY)
Age: 36
Discharge: HOME | End: 2021-06-27
Attending: EMERGENCY MEDICINE
Payer: COMMERCIAL

## 2021-06-27 VITALS
RESPIRATION RATE: 18 BRPM | HEART RATE: 99 BPM | SYSTOLIC BLOOD PRESSURE: 108 MMHG | DIASTOLIC BLOOD PRESSURE: 74 MMHG | BODY MASS INDEX: 23.3 KG/M2 | OXYGEN SATURATION: 99 % | HEIGHT: 66 IN | TEMPERATURE: 99 F | WEIGHT: 145 LBS

## 2021-06-27 DIAGNOSIS — O20.0 THREATENED MISCARRIAGE IN EARLY PREGNANCY: Primary | ICD-10-CM

## 2021-06-27 LAB
ANION GAP SERPL CALC-SCNC: 9 MMOL/L (ref 7–16)
BASOPHILS # BLD: 0.1 K/UL (ref 0–0.2)
BASOPHILS NFR BLD: 1 % (ref 0–2)
BUN SERPL-MCNC: 9 MG/DL (ref 6–23)
CALCIUM SERPL-MCNC: 8.8 MG/DL (ref 8.3–10.4)
CHLORIDE SERPL-SCNC: 105 MMOL/L (ref 98–107)
CO2 SERPL-SCNC: 26 MMOL/L (ref 21–32)
CREAT SERPL-MCNC: 0.64 MG/DL (ref 0.6–1)
DIFFERENTIAL METHOD BLD: NORMAL
EOSINOPHIL # BLD: 0.1 K/UL (ref 0–0.8)
EOSINOPHIL NFR BLD: 1 % (ref 0.5–7.8)
ERYTHROCYTE [DISTWIDTH] IN BLOOD BY AUTOMATED COUNT: 12.5 % (ref 11.9–14.6)
GLUCOSE SERPL-MCNC: 114 MG/DL (ref 65–100)
HCG SERPL-ACNC: ABNORMAL MIU/ML (ref 0–6)
HCT VFR BLD AUTO: 38.2 % (ref 35.8–46.3)
HGB BLD-MCNC: 13.1 G/DL (ref 11.7–15.4)
IMM GRANULOCYTES # BLD AUTO: 0 K/UL (ref 0–0.5)
IMM GRANULOCYTES NFR BLD AUTO: 0 % (ref 0–5)
LYMPHOCYTES # BLD: 2.9 K/UL (ref 0.5–4.6)
LYMPHOCYTES NFR BLD: 29 % (ref 13–44)
MCH RBC QN AUTO: 30 PG (ref 26.1–32.9)
MCHC RBC AUTO-ENTMCNC: 34.3 G/DL (ref 31.4–35)
MCV RBC AUTO: 87.6 FL (ref 79.6–97.8)
MONOCYTES # BLD: 0.5 K/UL (ref 0.1–1.3)
MONOCYTES NFR BLD: 5 % (ref 4–12)
NEUTS SEG # BLD: 6.4 K/UL (ref 1.7–8.2)
NEUTS SEG NFR BLD: 64 % (ref 43–78)
NRBC # BLD: 0 K/UL (ref 0–0.2)
PLATELET # BLD AUTO: 219 K/UL (ref 150–450)
PMV BLD AUTO: 10.1 FL (ref 9.4–12.3)
POTASSIUM SERPL-SCNC: 3.2 MMOL/L (ref 3.5–5.1)
RBC # BLD AUTO: 4.36 M/UL (ref 4.05–5.2)
SODIUM SERPL-SCNC: 140 MMOL/L (ref 136–145)
WBC # BLD AUTO: 10 K/UL (ref 4.3–11.1)

## 2021-06-27 PROCEDURE — 84702 CHORIONIC GONADOTROPIN TEST: CPT

## 2021-06-27 PROCEDURE — 80048 BASIC METABOLIC PNL TOTAL CA: CPT

## 2021-06-27 PROCEDURE — 85025 COMPLETE CBC W/AUTO DIFF WBC: CPT

## 2021-06-27 PROCEDURE — 99283 EMERGENCY DEPT VISIT LOW MDM: CPT

## 2021-06-27 RX ORDER — SODIUM CHLORIDE 0.9 % (FLUSH) 0.9 %
5-10 SYRINGE (ML) INJECTION EVERY 8 HOURS
Status: DISCONTINUED | OUTPATIENT
Start: 2021-06-27 | End: 2021-06-28 | Stop reason: HOSPADM

## 2021-06-27 RX ORDER — SODIUM CHLORIDE 0.9 % (FLUSH) 0.9 %
5-10 SYRINGE (ML) INJECTION AS NEEDED
Status: DISCONTINUED | OUTPATIENT
Start: 2021-06-27 | End: 2021-06-28 | Stop reason: HOSPADM

## 2021-06-28 NOTE — ED PROVIDER NOTES
The history is provided by the patient and the spouse. Pregnancy Problem   This is a new problem. The current episode started 6 to 12 hours ago. The problem occurs constantly. The problem has not changed since onset. Associated with: abd cramping. The pain is located in the suprapubic region. The quality of the pain is cramping. Pertinent negatives include no fever, no nausea, no vomiting, no dysuria and no myalgias. Nothing worsens the pain. The pain is relieved by nothing.         Past Medical History:   Diagnosis Date    Abnormal Papanicolaou smear of cervix     H/O seasonal allergies     Migraines     Use of letrozole (Femara)        Past Surgical History:   Procedure Laterality Date    HX COLPOSCOPY      HX LEEP PROCEDURE      HX OTHER SURGICAL      Jaw surgery         Family History:   Problem Relation Age of Onset    Diabetes Maternal Grandmother     No Known Problems Mother     Heart Attack Father     No Known Problems Maternal Grandfather     Pancreatic Cancer Paternal Grandmother         Pancreatic    No Known Problems Paternal Grandfather        Social History     Socioeconomic History    Marital status:      Spouse name: Not on file    Number of children: Not on file    Years of education: Not on file    Highest education level: Not on file   Occupational History    Not on file   Tobacco Use    Smoking status: Never Smoker    Smokeless tobacco: Never Used   Substance and Sexual Activity    Alcohol use: No    Drug use: No    Sexual activity: Yes     Partners: Male     Birth control/protection: None   Other Topics Concern     Service Not Asked    Blood Transfusions Not Asked    Caffeine Concern Not Asked    Occupational Exposure Not Asked    Hobby Hazards Not Asked    Sleep Concern Not Asked    Stress Concern Not Asked    Weight Concern Not Asked    Special Diet Not Asked    Back Care Not Asked    Exercise Not Asked    Bike Helmet Not Asked   [address] [facility] Not Asked    Self-Exams Not Asked   Social History Narrative    Not on file     Social Determinants of Health     Financial Resource Strain:     Difficulty of Paying Living Expenses:    Food Insecurity:     Worried About Running Out of Food in the Last Year:     [address] in the Last Year:    Transportation Needs:     Lack of Transportation (Medical):  Lack of Transportation (Non-Medical):    Physical Activity:     Days of Exercise per Week:     Minutes of Exercise per Session:    Stress:     Feeling of Stress :    Social Connections:     Frequency of Communication with Friends and Family:     Frequency of Social Gatherings with Friends and Family:     Attends Rastafari Services:     Active Member of Clubs or Organizations:     Attends Club or Organization Meetings:     Marital Status:    Intimate Partner Violence:     Fear of Current or Ex-Partner:     Emotionally Abused:     Physically Abused:     Sexually Abused: ALLERGIES: Patient has no known allergies. Review of Systems   Constitutional: Negative for chills and fever. Gastrointestinal: Positive for abdominal pain. Negative for nausea and vomiting. Endocrine: Negative for polyuria. Genitourinary: Positive for vaginal bleeding. Negative for dysuria. Musculoskeletal: Negative for myalgias. Skin: Negative for color change and pallor. Neurological: Negative for light-headedness. Psychiatric/Behavioral: Negative for confusion. Vitals:    06/27/21 2057   BP: 107/73   Pulse: (!) 113   Resp: 18   Temp: 99.3 °F (37.4 °C)   SpO2: 99%   Weight: 65.8 kg (145 lb)   Height: 5' 6\" (1.676 m)            Physical Exam  Vitals and nursing note reviewed. Constitutional:       General: She is not in acute distress. Appearance: She is not ill-appearing. Eyes:      Conjunctiva/sclera: Conjunctivae normal.   Cardiovascular:      Rate and Rhythm: Normal rate and regular rhythm. Heart sounds: Normal heart sounds. Pulmonary:      Breath sounds: Normal breath sounds. Abdominal:      General: There is no distension. Palpations: Abdomen is soft. Tenderness: There is no abdominal tenderness. There is no guarding or rebound. Genitourinary:     Comments: Pelvic exam reveals blood in the vaginal vault but no tissue or clots. Cervix is parous but closed. Musculoskeletal:         General: No swelling. Right lower leg: No edema. Left lower leg: No edema. Skin:     General: Skin is warm and dry. Neurological:      Mental Status: She is alert. MDM  Number of Diagnoses or Management Options  Diagnosis management comments: Positive cardiac activity on US, A positive blood type-pelvic to determine if OS open or closed. 10:47 PM  Cervix is closed. Threatened miscarriage. Pelvic rest advised. Follow-up with her OB/GYN this week-call in the morning for appointment. Amount and/or Complexity of Data Reviewed  Clinical lab tests: ordered and reviewed    Risk of Complications, Morbidity, and/or Mortality  Presenting problems: low  Diagnostic procedures: minimal  Management options: low    Patient Progress  Patient progress: stable         Bedside US    Date/Time: 6/27/2021 10:21 PM  Performed by: Mihri Russo MD  Authorized by: Mihir Russo MD     Written consent obtained: Yes    Verbal consent obtained: No    Given by:  Patient  Performed by: Attending  Type of procedure: Focused pelvic ultrasound obstetrical  Indications:  Pregnant by patient history  Transabdominal sagittal:  Adequate  Transabdominal transverse:  Adequate  Endovaginal sagittal:  Not obtained  Endovaginal coronal:  Not obtained  Intrauterine Pregnancy:  Present (c movement and fetal cardiac activity)  Interpretation:  Intrauterine pregnancy  Transabdominal sagittal:  Adequate  Transabdominal transverse:  Adequate  Endovaginal sagittal:  Not obtained  Endovaginal coronal:  Not obtained    Confirmation study:   I have advised the patient to obtain a confirmatory study as an outpatient.

## 2021-06-28 NOTE — ED TRIAGE NOTES
Pt c/o vaginal bleeding and abdominal cramping that started an 1.5 hour ago. Pt denies passing clots. Pt states she is 9 weeks pregnant. Pt OB  is [facility]. Pt states this is her third pregnancy.

## 2021-06-28 NOTE — ED NOTES
I have reviewed discharge instructions with the patient and spouse. The patient and spouse verbalized understanding. Patient left ED via Discharge Method: ambulatory to Home with spouse. Opportunity for questions and clarification provided. Patient given 0 scripts. To continue your aftercare when you leave the hospital, you may receive an automated call from our care team to check in on how you are doing. This is a free service and part of our promise to provide the best care and service to meet your aftercare needs.  If you have questions, or wish to unsubscribe from this service please call [phone]. Thank you for Choosing our [facility] Emergency Department.

## 2021-06-28 NOTE — DISCHARGE INSTRUCTIONS
Pelvic rest as discussed. Tylenol for pain. Call your OB/GYN in the morning for follow-up this week. Return if bleeding soaks through more than a pad per hour for a couple of hours or if you pass any tissue.

## 2021-07-15 PROBLEM — Z36.82 NUCHAL TRANSLUCENCY OF FETUS ON PRENATAL ULTRASOUND: Status: ACTIVE | Noted: 2021-07-15

## 2021-07-15 PROBLEM — R51.9 PREGNANCY HEADACHE IN FIRST TRIMESTER: Status: ACTIVE | Noted: 2021-06-07

## 2021-07-15 PROBLEM — O26.891 PREGNANCY HEADACHE IN FIRST TRIMESTER: Status: ACTIVE | Noted: 2021-06-07

## 2021-07-15 PROBLEM — O09.91 HIGH-RISK PREGNANCY IN FIRST TRIMESTER: Status: ACTIVE | Noted: 2021-06-07

## 2021-07-16 PROBLEM — Z36.82 NUCHAL TRANSLUCENCY OF FETUS ON PRENATAL ULTRASOUND: Status: RESOLVED | Noted: 2021-07-15 | Resolved: 2021-07-16

## 2021-07-16 PROBLEM — O46.8X1 SUBCHORIONIC HEMORRHAGE IN FIRST TRIMESTER: Status: ACTIVE | Noted: 2021-07-16

## 2021-07-16 PROBLEM — O41.8X10 SUBCHORIONIC HEMORRHAGE IN FIRST TRIMESTER: Status: ACTIVE | Noted: 2021-07-16

## 2021-07-16 PROBLEM — N83.209 OVARIAN CYST AFFECTING PREGNANCY IN FIRST TRIMESTER, ANTEPARTUM: Status: RESOLVED | Noted: 2021-06-07 | Resolved: 2021-07-16

## 2021-07-16 PROBLEM — O34.81 OVARIAN CYST AFFECTING PREGNANCY IN FIRST TRIMESTER, ANTEPARTUM: Status: RESOLVED | Noted: 2021-06-07 | Resolved: 2021-07-16

## 2021-08-19 PROBLEM — O26.892 PREGNANCY HEADACHE IN SECOND TRIMESTER: Status: ACTIVE | Noted: 2021-06-07

## 2021-08-19 PROBLEM — O09.92 HIGH-RISK PREGNANCY IN SECOND TRIMESTER: Status: ACTIVE | Noted: 2021-06-07

## 2021-08-19 PROBLEM — Z3A.16 16 WEEKS GESTATION OF PREGNANCY: Status: ACTIVE | Noted: 2021-08-19

## 2021-08-20 PROBLEM — Z3A.16 16 WEEKS GESTATION OF PREGNANCY: Status: RESOLVED | Noted: 2021-08-19 | Resolved: 2021-08-20

## 2021-08-20 PROBLEM — R09.81 CONGESTED NOSE: Status: ACTIVE | Noted: 2021-08-20

## 2022-01-07 ENCOUNTER — HOSPITAL ENCOUNTER (EMERGENCY)
Age: 37
Discharge: HOME | End: 2022-01-07
Attending: OBSTETRICS & GYNECOLOGY | Admitting: OBSTETRICS & GYNECOLOGY
Payer: COMMERCIAL

## 2022-01-07 VITALS
SYSTOLIC BLOOD PRESSURE: 113 MMHG | BODY MASS INDEX: 28.12 KG/M2 | WEIGHT: 175 LBS | OXYGEN SATURATION: 99 % | HEART RATE: 104 BPM | HEIGHT: 66 IN | TEMPERATURE: 98.1 F | RESPIRATION RATE: 18 BRPM | DIASTOLIC BLOOD PRESSURE: 71 MMHG

## 2022-01-07 LAB
ALBUMIN SERPL-MCNC: 3.1 G/DL (ref 3.5–5)
ALBUMIN/GLOB SERPL: 0.7 {RATIO} (ref 1.2–3.5)
ALP SERPL-CCNC: 137 U/L (ref 50–136)
ALT SERPL-CCNC: 18 U/L (ref 12–65)
ANION GAP SERPL CALC-SCNC: 10 MMOL/L (ref 7–16)
AST SERPL-CCNC: 13 U/L (ref 15–37)
BASOPHILS # BLD: 0 K/UL (ref 0–0.2)
BASOPHILS NFR BLD: 0 % (ref 0–2)
BILIRUB SERPL-MCNC: 0.5 MG/DL (ref 0.2–1.1)
BUN SERPL-MCNC: 16 MG/DL (ref 6–23)
CALCIUM SERPL-MCNC: 8.3 MG/DL (ref 8.3–10.4)
CHLORIDE SERPL-SCNC: 107 MMOL/L (ref 98–107)
CO2 SERPL-SCNC: 20 MMOL/L (ref 21–32)
COVID-19 RAPID TEST, COVR: DETECTED
CREAT SERPL-MCNC: 0.72 MG/DL (ref 0.6–1)
DIFFERENTIAL METHOD BLD: ABNORMAL
EOSINOPHIL # BLD: 0.1 K/UL (ref 0–0.8)
EOSINOPHIL NFR BLD: 0 % (ref 0.5–7.8)
ERYTHROCYTE [DISTWIDTH] IN BLOOD BY AUTOMATED COUNT: 14.6 % (ref 11.9–14.6)
GLOBULIN SER CALC-MCNC: 4.3 G/DL (ref 2.3–3.5)
GLUCOSE SERPL-MCNC: 126 MG/DL (ref 65–100)
HCT VFR BLD AUTO: 33.7 % (ref 35.8–46.3)
HGB BLD-MCNC: 11 G/DL (ref 11.7–15.4)
IMM GRANULOCYTES # BLD AUTO: 0.1 K/UL (ref 0–0.5)
IMM GRANULOCYTES NFR BLD AUTO: 1 % (ref 0–5)
LYMPHOCYTES # BLD: 1 K/UL (ref 0.5–4.6)
LYMPHOCYTES NFR BLD: 9 % (ref 13–44)
MCH RBC QN AUTO: 28.2 PG (ref 26.1–32.9)
MCHC RBC AUTO-ENTMCNC: 32.6 G/DL (ref 31.4–35)
MCV RBC AUTO: 86.4 FL (ref 79.6–97.8)
MONOCYTES # BLD: 0.4 K/UL (ref 0.1–1.3)
MONOCYTES NFR BLD: 4 % (ref 4–12)
NEUTS SEG # BLD: 9.8 K/UL (ref 1.7–8.2)
NEUTS SEG NFR BLD: 87 % (ref 43–78)
NRBC # BLD: 0 K/UL (ref 0–0.2)
PLATELET # BLD AUTO: 176 K/UL (ref 150–450)
PMV BLD AUTO: 10.1 FL (ref 9.4–12.3)
POTASSIUM SERPL-SCNC: 3.3 MMOL/L (ref 3.5–5.1)
PROT SERPL-MCNC: 7.4 G/DL (ref 6.3–8.2)
RBC # BLD AUTO: 3.9 M/UL (ref 4.05–5.2)
SARS-COV-2, COV2: NORMAL
SODIUM SERPL-SCNC: 137 MMOL/L (ref 136–145)
SOURCE, COVRS: ABNORMAL
WBC # BLD AUTO: 11.3 K/UL (ref 4.3–11.1)

## 2022-01-07 PROCEDURE — 99282 EMERGENCY DEPT VISIT SF MDM: CPT

## 2022-01-07 PROCEDURE — 80053 COMPREHEN METABOLIC PANEL: CPT

## 2022-01-07 PROCEDURE — 87635 SARS-COV-2 COVID-19 AMP PRB: CPT

## 2022-01-07 PROCEDURE — 74011250636 HC RX REV CODE- 250/636: Performed by: OBSTETRICS & GYNECOLOGY

## 2022-01-07 PROCEDURE — 85025 COMPLETE CBC W/AUTO DIFF WBC: CPT

## 2022-01-07 PROCEDURE — 96374 THER/PROPH/DIAG INJ IV PUSH: CPT

## 2022-01-07 PROCEDURE — 96375 TX/PRO/DX INJ NEW DRUG ADDON: CPT

## 2022-01-07 PROCEDURE — 59025 FETAL NON-STRESS TEST: CPT

## 2022-01-07 PROCEDURE — 96360 HYDRATION IV INFUSION INIT: CPT

## 2022-01-07 RX ORDER — ONDANSETRON 4 MG/1
4 TABLET, ORALLY DISINTEGRATING ORAL
Qty: 12 TABLET | Refills: 0 | Status: SHIPPED | OUTPATIENT
Start: 2022-01-07 | End: 2022-01-12 | Stop reason: ALTCHOICE

## 2022-01-07 RX ORDER — DEXTROSE, SODIUM CHLORIDE, SODIUM LACTATE, POTASSIUM CHLORIDE, AND CALCIUM CHLORIDE 5; .6; .31; .03; .02 G/100ML; G/100ML; G/100ML; G/100ML; G/100ML
125 INJECTION, SOLUTION INTRAVENOUS CONTINUOUS
Status: DISCONTINUED | OUTPATIENT
Start: 2022-01-07 | End: 2022-01-07 | Stop reason: HOSPADM

## 2022-01-07 RX ORDER — ONDANSETRON 2 MG/ML
4 INJECTION INTRAMUSCULAR; INTRAVENOUS ONCE
Status: COMPLETED | OUTPATIENT
Start: 2022-01-07 | End: 2022-01-07

## 2022-01-07 RX ORDER — SODIUM CHLORIDE, SODIUM LACTATE, POTASSIUM CHLORIDE, CALCIUM CHLORIDE 600; 310; 30; 20 MG/100ML; MG/100ML; MG/100ML; MG/100ML
200 INJECTION, SOLUTION INTRAVENOUS CONTINUOUS
Status: DISCONTINUED | OUTPATIENT
Start: 2022-01-07 | End: 2022-01-07

## 2022-01-07 RX ADMIN — SODIUM CHLORIDE, SODIUM LACTATE, POTASSIUM CHLORIDE, CALCIUM CHLORIDE, AND DEXTROSE MONOHYDRATE 125 ML/HR: 600; 310; 30; 20; 5 INJECTION, SOLUTION INTRAVENOUS at 06:30

## 2022-01-07 RX ADMIN — SODIUM CHLORIDE, SODIUM LACTATE, POTASSIUM CHLORIDE, AND CALCIUM CHLORIDE 200 ML/HR: 600; 310; 30; 20 INJECTION, SOLUTION INTRAVENOUS at 06:00

## 2022-01-07 RX ADMIN — ONDANSETRON 4 MG: 2 INJECTION INTRAMUSCULAR; INTRAVENOUS at 06:10

## 2022-01-07 NOTE — DISCHARGE INSTRUCTIONS
Patient Education        Preparing for Childbirth: Care Instructions  Overview     You are getting close to the birth of your child. For months, you've been taking care of yourself and the baby. Now you can still take steps that will help you have a healthy labor and birth. You can take classes to help prepare for the birth. You also can talk with your doctor about what you would like to happen during your labor. Changes happen in the last 2 months of your pregnancy. Your baby becomes too big to move around easily inside the uterus and may seem to move less. At the end of your pregnancy, your baby probably will settle into a head-down position. You will likely feel some pressure in your pelvis as you get close to the birth. You may notice times when your belly tightens and becomes firm to the touch, then relaxes. These are called [place] contractions. They sometimes occur as often as every 10 to 20 minutes. These contractions usually stop when you are active. (True labor pains continue or increase if you move around.)  Rupture of your membranes (\"breaking of the water\") often is a sign that labor has started or is about to start. This happens when a hole or tear develops in the fluid-filled bag (amniotic sac) that surrounds and protects your baby. You may feel a huge gush of water or a steady trickle of fluid. Call your doctor or go to the hospital if you think this has happened. Contractions may start, or if you are already having contractions, they may get stronger. Follow-up care is a key part of your treatment and safety. Be sure to make and go to all appointments, and call your doctor if you are having problems. It's also a good idea to know your test results and keep a list of the medicines you take. How can you care for yourself at home? · Get plenty of rest.  · Take childbirth classes with your partner or . You will learn relaxation exercises that are helpful during labor.  You also will learn what you can do to manage labor pain. · If you have other children, take a class to learn how to help them adjust to the new baby. · Develop a written birth plan, if you choose to, keeping in mind that labor is hard to predict and your plan may change after labor begins. Some of what a birth plan may address includes:  ? Where you would like to have your baby. This includes the building and the room. It could be the hospital's birthing room, a separate birthing center, or your own home. ? Who you would like to assist with delivery of your baby. You may want your doctor, an obstetrician, or a certified nurse-midwife. Some women prefer a lay midwife or a  to provide support before and after delivery. ? Whether you want a , nurse, midwife, or  to give you support from early labor until after childbirth. ? What comfort measures you want. You may have to choose between walking around during labor or having the security of a heart monitor for your baby. You may want to listen to music during labor. You may know what position you want to be in (such as sitting, squatting, or reclining) for pushing. ? What pain relief you would like. There are several choices, so be sure to talk with your doctor about them. ? How you want you and your baby to spend the first few hours after birth. § You may want to keep your baby with you for at least 1 hour after birth for bonding and early breastfeeding. § You may want the hospital to delay some infant care steps, such as a vitamin K injection, so that you have calming time with your baby. § You may not want visitors, or you may want other family members there. · Know the early signs of labor, such as a steady ache low in your back. · If you are going to a hospital or clinic to have your baby, have your bag ready to go. ? Pack a nightgown, robe, panties, socks, and slippers.  You may want to bring your own soap, shampoo, brush, toothbrush, and toothpaste. Bring your own self-adhesive sanitary pads. ? In your baby's bag, bring an outfit, small blanket, and diapers. Have your car seat ready to go. When should you call for help? Watch closely for changes in your health, and be sure to contact your doctor if you have any questions about preparing for childbirth. Where can you learn more? Go to http://www.gray.com/  Enter [id number] in the search box to learn more about \"Preparing for Childbirth: Care Instructions. \"  Current as of: June 16, 2021               Content Version: 13.0  © 4904-9653 [employer], [employer]. Care instructions adapted under license by [employer] (which disclaims liability or warranty for this information). If you have questions about a medical condition or this instruction, always ask your healthcare professional. [address] any warranty or liability for your use of this information.

## 2022-01-07 NOTE — PROGRESS NOTES
Pt given Regen-Cov info. Pt decided not to receive infusion at this time. MD made aware. Pt okay to discharge.

## 2022-01-07 NOTE — PROGRESS NOTES
Pt presents to [facility] with complaint of n/v since last night. Pt was seen in urgent care earlier this week for a sinus infection. Pt denies LOF, states + FM and states some cramping since n/v started.

## 2022-01-07 NOTE — PROGRESS NOTES
Discharge instruction given. Information/teaching printout given to patient. States understanding. All questions answered. Informed pt to return to hospital if she doesn't feel baby move, if she suspects her water breaks, or if vaginal bleeding occurs that is more than spotting, or she starts to experience painful regular contractions. Ambulate out to personal auto.

## 2022-01-07 NOTE — H&P
CC: n/v    31 y.o. female  at 36w6d  weeks gestation who requests evaluation for n/v. She was seen at urgent care Wednesday and dxed with sinus infection, no covid test done. Thursday her daughter began vomiting and she began yesterday as well. Has had recurrent vomiting,not tolerating po, and has had 1 episode of diarrhea. No fever, vb or lof. Had sinus drainage when started on abx.      Her pregnancy issues include: none    Fetal movement has beennormal .    HISTORY:  OB History    Para Term  AB Living   3 2 2     2   SAB IAB Ectopic Molar Multiple Live Births           0 2      # Outcome Date GA Lbr Brandon/2nd Weight Sex Delivery Anes PTL Lv   3 Current            2 Term 20 39w2d / 01:01 4.7 kg M Vag-Spont EPI  TRUDY   1 Term 17 39w3d 15:00 / 01:30 3.33 kg F Vag-Spont EPIDURAL AN N TRUDY       Past Surgical History:   Procedure Laterality Date    HX COLPOSCOPY      HX LEEP PROCEDURE      HX OTHER SURGICAL      Jaw surgery       Past Medical History:   Diagnosis Date    Abnormal Papanicolaou smear of cervix     H/O seasonal allergies     Migraines     Use of letrozole (Femara)        No Known Allergies    Family History   Problem Relation Age of Onset    Diabetes Maternal Grandmother     No Known Problems Mother     Heart Attack Father     No Known Problems Maternal Grandfather     Pancreatic Cancer Paternal Grandmother         Pancreatic    No Known Problems Paternal Grandfather        Social History     Socioeconomic History    Marital status:      Spouse name: Not on file    Number of children: Not on file    Years of education: Not on file    Highest education level: Not on file   Occupational History    Not on file   Tobacco Use    Smoking status: Never Smoker    Smokeless tobacco: Never Used   Substance and Sexual Activity    Alcohol use: No    Drug use: No    Sexual activity: Yes     Partners: Male     Birth control/protection: None   Other Topics Concern [address] Not Asked    Blood Transfusions Not Asked    Caffeine Concern Not Asked    Occupational Exposure Not Asked    Hobby Hazards Not Asked    Sleep Concern Not Asked    Stress Concern Not Asked    Weight Concern Not Asked    Special Diet Not Asked    Back Care Not Asked    Exercise Not Asked    Bike Helmet Not Asked   [address] Not Asked    Self-Exams Not Asked   Social History Narrative    Not on file     Social Determinants of Health     Financial Resource Strain:     Difficulty of Paying Living Expenses: Not on file   Food Insecurity:     Worried About Running Out of Food in the Last Year: Not on file    Ira of Food in the Last Year: Not on file   Transportation Needs:     Lack of Transportation (Medical): Not on file    Lack of Transportation (Non-Medical): Not on file   Physical Activity:     Days of Exercise per Week: Not on file    Minutes of Exercise per Session: Not on file   Stress:     Feeling of Stress : Not on file   Social Connections:     Frequency of Communication with Friends and Family: Not on file    Frequency of Social Gatherings with Friends and Family: Not on file    Attends Latter-day Services: Not on file    Active Member of [address] [place] [address] or Organizations: Not on file    Attends Club or Organization Meetings: Not on file    Marital Status: Not on file   Intimate Partner Violence:     Fear of Current or Ex-Partner: Not on file    Emotionally Abused: Not on file    Physically Abused: Not on file    Sexually Abused: Not on file   Housing Stability:     Unable to Pay for Housing in the Last Year: Not on file    Number of [place] in the Last Year: Not on file    Unstable Housing in the Last Year: Not on file       ROS:  Negative:   negative 10 point ROS except as noted in HPI    Positive:   per hpi    PHYSICAL EXAM:  Last menstrual period 04/10/2021, currently breastfeeding. The patient appears fatigued but non toxic, alert, oriented x 3. Appropriate affect. Lungs are clear. Heart RRR, no murmurs. Abdomen soft, non-tender, no rebound/guarding, normoactive bs. Fundus soft and non tender  Skin warm, dry, no rashes  Ext no edema, DTR's normal    Cervix:deferred    Fetal Heart Rate: Baseline: 140 per minute  Variability: moderate  Accelerations: no  Decelerations: none  Uterine contractions: none   I personally reviewed and interpreted the FHR tracing    Recent Results (from the past 24 hour(s))   SARS-COV-2    Collection Time: 01/07/22  5:28 AM   Result Value Ref Range    SARS-CoV-2 Please find results under separate order         Tests performed and reviwed:   UA: >1030, >300 protein    I have personally reviewed the patient's history, prenatal record, and pertinent test results. vital sign trends, laboratory results, previous provider notes support my clinical impression. Assessment:  31 y.o. female at 36w7d  new onset n/v preceded by uri sx. dehydration and new proteinuria secondary? to dehydration  Reassuring fetal status    Plan:  Ivf, labs. Continuous monitoring. Obtain covid test.  Findings and test results were discussed.   Time spent with patient consistent with level of care    Signed By:  Scott Bryant MD     January 7, 2022

## 2022-01-09 NOTE — PROGRESS NOTES
In pt chart over the phone to give her the positive Rapid COVID test results. The three day test is still not resulted.

## 2022-01-11 PROBLEM — U07.1 COVID-19 AFFECTING PREGNANCY IN THIRD TRIMESTER: Status: ACTIVE | Noted: 2022-01-11

## 2022-01-11 PROBLEM — O98.513 COVID-19 AFFECTING PREGNANCY IN THIRD TRIMESTER: Status: ACTIVE | Noted: 2022-01-11

## 2022-01-12 PROBLEM — O41.8X10 SUBCHORIONIC HEMORRHAGE IN FIRST TRIMESTER: Status: RESOLVED | Noted: 2021-07-16 | Resolved: 2022-01-12

## 2022-01-12 PROBLEM — O09.93 HIGH-RISK PREGNANCY IN THIRD TRIMESTER: Status: ACTIVE | Noted: 2021-06-07

## 2022-01-12 PROBLEM — O26.893 PREGNANCY HEADACHE IN THIRD TRIMESTER: Status: ACTIVE | Noted: 2021-06-07

## 2022-01-12 PROBLEM — O46.8X1 SUBCHORIONIC HEMORRHAGE IN FIRST TRIMESTER: Status: RESOLVED | Noted: 2021-07-16 | Resolved: 2022-01-12

## 2022-01-13 PROBLEM — R09.81 CONGESTED NOSE: Status: RESOLVED | Noted: 2021-08-20 | Resolved: 2022-01-13

## 2022-01-24 ENCOUNTER — HOSPITAL ENCOUNTER (INPATIENT)
Age: 37
LOS: 2 days | Discharge: HOME | End: 2022-01-26
Attending: OBSTETRICS & GYNECOLOGY | Admitting: OBSTETRICS & GYNECOLOGY
Payer: COMMERCIAL

## 2022-01-24 ENCOUNTER — ANESTHESIA EVENT (OUTPATIENT)
Dept: LABOR AND DELIVERY | Age: 37
End: 2022-01-24
Payer: COMMERCIAL

## 2022-01-24 ENCOUNTER — ANESTHESIA (OUTPATIENT)
Dept: LABOR AND DELIVERY | Age: 37
End: 2022-01-24
Payer: COMMERCIAL

## 2022-01-24 DIAGNOSIS — Z34.90 ENCOUNTER FOR INDUCTION OF LABOR: ICD-10-CM

## 2022-01-24 DIAGNOSIS — U07.1 COVID-19 AFFECTING PREGNANCY IN THIRD TRIMESTER: ICD-10-CM

## 2022-01-24 DIAGNOSIS — O98.513 COVID-19 AFFECTING PREGNANCY IN THIRD TRIMESTER: ICD-10-CM

## 2022-01-24 LAB
ABO + RH BLD: NORMAL
BASE DEFICIT BLD-SCNC: 0.5 MMOL/L
BASE DEFICIT BLD-SCNC: 1.2 MMOL/L
BLOOD GROUP ANTIBODIES SERPL: NORMAL
ERYTHROCYTE [DISTWIDTH] IN BLOOD BY AUTOMATED COUNT: 14.9 % (ref 11.9–14.6)
HCO3 BLD-SCNC: 26.6 MMOL/L (ref 22–26)
HCO3 BLDV-SCNC: 23.3 MMOL/L (ref 23–28)
HCT VFR BLD AUTO: 32.6 % (ref 35.8–46.3)
HGB BLD-MCNC: 10.5 G/DL (ref 11.7–15.4)
MCH RBC QN AUTO: 27.3 PG (ref 26.1–32.9)
MCHC RBC AUTO-ENTMCNC: 32.2 G/DL (ref 31.4–35)
MCV RBC AUTO: 84.9 FL (ref 79.6–97.8)
NRBC # BLD: 0 K/UL (ref 0–0.2)
PCO2 BLDCO: 38 MMHG (ref 32–68)
PCO2 BLDCO: 52 MMHG (ref 32–68)
PH BLDCO: 7.32 [PH] (ref 7.15–7.38)
PH BLDCO: 7.4 [PH] (ref 7.15–7.38)
PLATELET # BLD AUTO: 175 K/UL (ref 150–450)
PMV BLD AUTO: 9.8 FL (ref 9.4–12.3)
PO2 BLDCO: 18 MMHG
PO2 BLDCO: 24 MMHG
RBC # BLD AUTO: 3.84 M/UL (ref 4.05–5.2)
SAO2 % BLD: 22.8 % (ref 95–98)
SAO2 % BLDV: 42.9 % (ref 65–88)
SERVICE CMNT-IMP: ABNORMAL
SERVICE CMNT-IMP: ABNORMAL
SPECIMEN EXP DATE BLD: NORMAL
SPECIMEN TYPE: ABNORMAL
SPECIMEN TYPE: ABNORMAL
WBC # BLD AUTO: 7.2 K/UL (ref 4.3–11.1)

## 2022-01-24 PROCEDURE — 3E033VJ INTRODUCTION OF OTHER HORMONE INTO PERIPHERAL VEIN, PERCUTANEOUS APPROACH: ICD-10-PCS | Performed by: OBSTETRICS & GYNECOLOGY

## 2022-01-24 PROCEDURE — 0KQM0ZZ REPAIR PERINEUM MUSCLE, OPEN APPROACH: ICD-10-PCS | Performed by: OBSTETRICS & GYNECOLOGY

## 2022-01-24 PROCEDURE — 75410000003 HC RECOV DEL/VAG/CSECN EA 0.5 HR

## 2022-01-24 PROCEDURE — 76060000078 HC EPIDURAL ANESTHESIA

## 2022-01-24 PROCEDURE — 75410000000 HC DELIVERY VAGINAL/SINGLE

## 2022-01-24 PROCEDURE — 82803 BLOOD GASES ANY COMBINATION: CPT

## 2022-01-24 PROCEDURE — 59400 OBSTETRICAL CARE: CPT | Performed by: OBSTETRICS & GYNECOLOGY

## 2022-01-24 PROCEDURE — 77030014125 HC TY EPDRL BBMI -B: Performed by: ANESTHESIOLOGY

## 2022-01-24 PROCEDURE — 77030011945 HC CATH URIN INT ST MENT -A

## 2022-01-24 PROCEDURE — 74011250637 HC RX REV CODE- 250/637: Performed by: OBSTETRICS & GYNECOLOGY

## 2022-01-24 PROCEDURE — 86900 BLOOD TYPING SEROLOGIC ABO: CPT

## 2022-01-24 PROCEDURE — 10907ZC DRAINAGE OF AMNIOTIC FLUID, THERAPEUTIC FROM PRODUCTS OF CONCEPTION, VIA NATURAL OR ARTIFICIAL OPENING: ICD-10-PCS | Performed by: OBSTETRICS & GYNECOLOGY

## 2022-01-24 PROCEDURE — 85027 COMPLETE CBC AUTOMATED: CPT

## 2022-01-24 PROCEDURE — 75410000002 HC LABOR FEE PER 1 HR

## 2022-01-24 PROCEDURE — 74011250636 HC RX REV CODE- 250/636: Performed by: OBSTETRICS & GYNECOLOGY

## 2022-01-24 PROCEDURE — 2709999900 HC NON-CHARGEABLE SUPPLY

## 2022-01-24 PROCEDURE — 77030002888 HC SUT CHRMC J&J -A

## 2022-01-24 PROCEDURE — 65270000029 HC RM PRIVATE

## 2022-01-24 PROCEDURE — A4300 CATH IMPL VASC ACCESS PORTAL: HCPCS | Performed by: ANESTHESIOLOGY

## 2022-01-24 PROCEDURE — 74011250636 HC RX REV CODE- 250/636: Performed by: NURSE ANESTHETIST, CERTIFIED REGISTERED

## 2022-01-24 PROCEDURE — 74011000250 HC RX REV CODE- 250: Performed by: OBSTETRICS & GYNECOLOGY

## 2022-01-24 PROCEDURE — 36415 COLL VENOUS BLD VENIPUNCTURE: CPT

## 2022-01-24 PROCEDURE — 77030018846 HC SOL IRR STRL H20 ICUM -A

## 2022-01-24 RX ORDER — OXYCODONE HCL 5 MG/5 ML
5 SOLUTION, ORAL ORAL
Status: DISCONTINUED | OUTPATIENT
Start: 2022-01-24 | End: 2022-01-26 | Stop reason: HOSPADM

## 2022-01-24 RX ORDER — ASPIRIN 325 MG
325 TABLET, DELAYED RELEASE (ENTERIC COATED) ORAL
Status: DISCONTINUED | OUTPATIENT
Start: 2022-01-24 | End: 2022-01-24

## 2022-01-24 RX ORDER — OXYTOCIN/RINGER'S LACTATE 30/500 ML
10 PLASTIC BAG, INJECTION (ML) INTRAVENOUS AS NEEDED
Status: COMPLETED | OUTPATIENT
Start: 2022-01-24 | End: 2022-01-24

## 2022-01-24 RX ORDER — ZOLPIDEM TARTRATE 5 MG/1
5 TABLET ORAL
Status: DISCONTINUED | OUTPATIENT
Start: 2022-01-24 | End: 2022-01-26 | Stop reason: HOSPADM

## 2022-01-24 RX ORDER — OXYCODONE AND ACETAMINOPHEN 7.5; 325 MG/1; MG/1
2 TABLET ORAL
Status: DISCONTINUED | OUTPATIENT
Start: 2022-01-24 | End: 2022-01-24

## 2022-01-24 RX ORDER — ROPIVACAINE HYDROCHLORIDE 2 MG/ML
INJECTION, SOLUTION EPIDURAL; INFILTRATION; PERINEURAL
Status: DISCONTINUED | OUTPATIENT
Start: 2022-01-24 | End: 2022-01-24 | Stop reason: HOSPADM

## 2022-01-24 RX ORDER — SODIUM CHLORIDE 0.9 % (FLUSH) 0.9 %
5-40 SYRINGE (ML) INJECTION EVERY 8 HOURS
Status: DISCONTINUED | OUTPATIENT
Start: 2022-01-24 | End: 2022-01-24

## 2022-01-24 RX ORDER — FENTANYL CITRATE 50 UG/ML
INJECTION, SOLUTION INTRAMUSCULAR; INTRAVENOUS AS NEEDED
Status: DISCONTINUED | OUTPATIENT
Start: 2022-01-24 | End: 2022-01-24 | Stop reason: HOSPADM

## 2022-01-24 RX ORDER — GUAIFENESIN 100 MG/5ML
162 LIQUID (ML) ORAL DAILY
Status: DISCONTINUED | OUTPATIENT
Start: 2022-01-25 | End: 2022-01-26 | Stop reason: HOSPADM

## 2022-01-24 RX ORDER — LIDOCAINE HYDROCHLORIDE 10 MG/ML
1 INJECTION INFILTRATION; PERINEURAL
Status: DISCONTINUED | OUTPATIENT
Start: 2022-01-24 | End: 2022-01-24

## 2022-01-24 RX ORDER — DIPHENHYDRAMINE HCL 12.5MG/5ML
25 ELIXIR ORAL
Status: DISCONTINUED | OUTPATIENT
Start: 2022-01-24 | End: 2022-01-26 | Stop reason: HOSPADM

## 2022-01-24 RX ORDER — OXYCODONE HCL 5 MG/5 ML
7.5 SOLUTION, ORAL ORAL
Status: DISCONTINUED | OUTPATIENT
Start: 2022-01-24 | End: 2022-01-26 | Stop reason: HOSPADM

## 2022-01-24 RX ORDER — GUAIFENESIN 100 MG/5ML
324 LIQUID (ML) ORAL
Status: DISCONTINUED | OUTPATIENT
Start: 2022-01-24 | End: 2022-01-24 | Stop reason: DRUGHIGH

## 2022-01-24 RX ORDER — OXYTOCIN/RINGER'S LACTATE 30/500 ML
0-20 PLASTIC BAG, INJECTION (ML) INTRAVENOUS
Status: DISCONTINUED | OUTPATIENT
Start: 2022-01-24 | End: 2022-01-24

## 2022-01-24 RX ORDER — TRIPROLIDINE/PSEUDOEPHEDRINE 2.5MG-60MG
400 TABLET ORAL
Status: DISCONTINUED | OUTPATIENT
Start: 2022-01-24 | End: 2022-01-26 | Stop reason: HOSPADM

## 2022-01-24 RX ORDER — OXYTOCIN/RINGER'S LACTATE 30/500 ML
87.3 PLASTIC BAG, INJECTION (ML) INTRAVENOUS AS NEEDED
Status: DISCONTINUED | OUTPATIENT
Start: 2022-01-24 | End: 2022-01-24

## 2022-01-24 RX ORDER — MINERAL OIL
120 OIL (ML) ORAL
Status: COMPLETED | OUTPATIENT
Start: 2022-01-24 | End: 2022-01-24

## 2022-01-24 RX ORDER — NALOXONE HYDROCHLORIDE 0.4 MG/ML
0.4 INJECTION, SOLUTION INTRAMUSCULAR; INTRAVENOUS; SUBCUTANEOUS AS NEEDED
Status: DISCONTINUED | OUTPATIENT
Start: 2022-01-24 | End: 2022-01-26 | Stop reason: HOSPADM

## 2022-01-24 RX ORDER — DIPHENHYDRAMINE HCL 25 MG
25 CAPSULE ORAL
Status: DISCONTINUED | OUTPATIENT
Start: 2022-01-24 | End: 2022-01-24

## 2022-01-24 RX ORDER — SIMETHICONE 80 MG
80 TABLET,CHEWABLE ORAL
Status: DISCONTINUED | OUTPATIENT
Start: 2022-01-24 | End: 2022-01-26 | Stop reason: HOSPADM

## 2022-01-24 RX ORDER — DEXTROSE, SODIUM CHLORIDE, SODIUM LACTATE, POTASSIUM CHLORIDE, AND CALCIUM CHLORIDE 5; .6; .31; .03; .02 G/100ML; G/100ML; G/100ML; G/100ML; G/100ML
125 INJECTION, SOLUTION INTRAVENOUS CONTINUOUS
Status: DISCONTINUED | OUTPATIENT
Start: 2022-01-24 | End: 2022-01-24

## 2022-01-24 RX ORDER — OXYCODONE AND ACETAMINOPHEN 5; 325 MG/1; MG/1
1 TABLET ORAL
Status: DISCONTINUED | OUTPATIENT
Start: 2022-01-24 | End: 2022-01-24

## 2022-01-24 RX ORDER — OXYTOCIN/RINGER'S LACTATE 30/500 ML
0-30 PLASTIC BAG, INJECTION (ML) INTRAVENOUS
Status: DISCONTINUED | OUTPATIENT
Start: 2022-01-24 | End: 2022-01-24

## 2022-01-24 RX ORDER — LIDOCAINE HYDROCHLORIDE 20 MG/ML
JELLY TOPICAL
Status: DISCONTINUED | OUTPATIENT
Start: 2022-01-24 | End: 2022-01-24

## 2022-01-24 RX ORDER — BUTORPHANOL TARTRATE 2 MG/ML
1 INJECTION INTRAMUSCULAR; INTRAVENOUS
Status: DISCONTINUED | OUTPATIENT
Start: 2022-01-24 | End: 2022-01-24

## 2022-01-24 RX ORDER — SODIUM CHLORIDE 0.9 % (FLUSH) 0.9 %
5-40 SYRINGE (ML) INJECTION AS NEEDED
Status: DISCONTINUED | OUTPATIENT
Start: 2022-01-24 | End: 2022-01-24

## 2022-01-24 RX ORDER — IBUPROFEN 400 MG/1
400 TABLET ORAL
Status: DISCONTINUED | OUTPATIENT
Start: 2022-01-24 | End: 2022-01-24

## 2022-01-24 RX ADMIN — Medication 10000 MILLI-UNITS: at 16:08

## 2022-01-24 RX ADMIN — Medication 87.3 MILLI-UNITS/MIN: at 16:29

## 2022-01-24 RX ADMIN — FENTANYL CITRATE 100 MCG: 50 INJECTION INTRAMUSCULAR; INTRAVENOUS at 15:24

## 2022-01-24 RX ADMIN — IBUPROFEN 400 MG: 200 SUSPENSION ORAL at 22:40

## 2022-01-24 RX ADMIN — ROPIVACAINE HYDROCHLORIDE 8 ML/HR: 2 INJECTION, SOLUTION EPIDURAL; INFILTRATION at 11:50

## 2022-01-24 RX ADMIN — SODIUM CHLORIDE, PRESERVATIVE FREE 10 ML: 5 INJECTION INTRAVENOUS at 14:05

## 2022-01-24 RX ADMIN — MINERAL OIL 120 ML: 1000 LIQUID ORAL at 16:55

## 2022-01-24 RX ADMIN — Medication 2 MILLI-UNITS/MIN: at 07:52

## 2022-01-24 RX ADMIN — SODIUM CHLORIDE, SODIUM LACTATE, POTASSIUM CHLORIDE, CALCIUM CHLORIDE, AND DEXTROSE MONOHYDRATE 125 ML/HR: 600; 310; 30; 20; 5 INJECTION, SOLUTION INTRAVENOUS at 07:38

## 2022-01-24 RX ADMIN — OXYCODONE HYDROCHLORIDE 7.5 MG: 5 SOLUTION ORAL at 20:27

## 2022-01-24 RX ADMIN — SODIUM CHLORIDE, PRESERVATIVE FREE 10 ML: 5 INJECTION INTRAVENOUS at 07:42

## 2022-01-24 NOTE — H&P
Mili Cure  [id number]      History and Physical  Subjective:     Patient is a 31 y.o.  female presents with induction recent 3rd trimester covid. See office notes on prenatal care. Lab Results   Component Value Date/Time    ABO/Rh(D) A POSITIVE 01/24/2022 07:54 AM    Antibody screen, External negative 06/07/2021 12:00 AM    Antibody screen NEG 01/24/2022 07:54 AM    Rubella, External immune 06/07/2021 12:00 AM    HBsAg, External negative 06/07/2021 12:00 AM    HIV, External NR 06/07/2021 12:00 AM    RPR, External NR 06/07/2021 12:00 AM    Gonorrhea, External neg 03/24/2017 12:00 AM    Chlamydia, External neg 03/24/2017 12:00 AM    ABO,Rh A positive 06/07/2021 12:00 AM               Patient Active Problem List    Diagnosis Date Noted    Encounter for induction of labor 01/24/2022    COVID-19 affecting pregnancy in third trimester 01/11/2022    High-risk pregnancy in third trimester 06/07/2021    History of macrosomia in infant in prior pregnancy, currently pregnant 06/07/2021    History of LEEP (loop electrosurgical excision procedure) of cervix complicating pregnancy 85/95/9461    Pregnancy headache in third trimester 06/07/2021     Past Medical History:   Diagnosis Date    Abnormal Papanicolaou smear of cervix     H/O seasonal allergies     Migraines     Subchorionic hemorrhage in first trimester 7/16/2021 7/16/2021 at [facility]: Went to ED on 6/27/21 with bleeding. Seen today 0.8 cm x 3.9 cm x 5.3 cm, healing. Pt no recent fresh bleeding. 8/20/2021 [facility]: [address] resolved. Low lying placenta likely to resolve.  Use of letrozole (Femara)       Past Surgical History:   Procedure Laterality Date    HX COLPOSCOPY      HX LEEP PROCEDURE      HX OTHER SURGICAL      Jaw surgery      Prior to Admission Medications   Prescriptions Last Dose Informant Patient Reported? Taking? BABY ASPIRIN PO Not Taking at Unknown time  Yes No   Sig: Take  by mouth.    Patient not taking: Reported on 1/3/2022 acetaminophen (TylenoL) 325 mg tablet 12/24/2021 at Unknown time  Yes Yes   Sig: Take  by mouth every four (4) hours as needed for Pain. ascorbic acid, vitamin C, (Vitamin C) 250 mg tablet 1/23/2022 at Unknown time  Yes Yes   Sig: Take  by mouth. cholecalciferol, vitamin D3, (VITAMIN D3 PO) 1/23/2022 at Unknown time  Yes Yes   Sig: Take  by mouth.   guaifenesin/pseudoephedrne HCl (ROBITUSSIN PE PO) 12/24/2021 at Unknown time  Yes Yes   Sig: Take  by mouth.   multivit 47/iron/folate 1/dha (PNV-DHA PO) 1/23/2022 at Unknown time  Yes Yes   Sig: Take  by mouth. Facility-Administered Medications: None     No Known Allergies   Social History     Tobacco Use    Smoking status: Never Smoker    Smokeless tobacco: Never Used   Substance Use Topics    Alcohol use: No      Family History   Problem Relation Age of Onset    Diabetes Maternal Grandmother     No Known Problems Mother     Heart Attack Father     No Known Problems Maternal Grandfather     Pancreatic Cancer Paternal Grandmother         Pancreatic    No Known Problems Paternal Grandfather           Objective:     Patient Vitals for the past 8 hrs:   BP Temp Pulse Resp SpO2 Height Weight   01/24/22 0932 111/69  100       01/24/22 0901 103/74  (!) 103       01/24/22 0831 111/72  88       01/24/22 0800 118/75  94       01/24/22 0715 117/82 98.7 °F (37.1 °C) (!) 104 18 99 % 5' 6\" (1.676 m) 178 lb (80.7 kg)        Fetal Monitoring:    Patient Vitals for the past 4 hrs:    Mode Fetal Heart Rate Variability Decelerations Accelerations RN Reviewed Strip?   01/24/22 0945 External 145 6-25 BPM None Yes Yes   01/24/22 0930 External 145 6-25 BPM None Yes Yes   01/24/22 0915 External 135 6-25 BPM None Yes Yes   01/24/22 0900 External 140 6-25 BPM None Yes Yes   01/24/22 0842 External 135 6-25 BPM None Yes Yes   01/24/22 0830 External 130 6-25 BPM None Yes Yes   01/24/22 0815 External 135 6-25 BPM None Yes Yes   01/24/22 0800 External 140 6-25 BPM None Yes Yes      Uterine Activity: Frequency: Every 1-3 minutes   Dilation: 4 cm   Effacement: 50%   Station: Floating    Assessment:     Active Problems:    Encounter for induction of labor (1/24/2022)      High-risk pregnancy in third trimester (6/7/2021)      Overview: 7/16/2021 at [facility]: Normal NT 1.4 mm, nasal bone seen. Pt reports that she       will get [id number] done at her primary OB office at next visit. --> low       risk      8/20/2021 at [facility]: Normal early anatomy; [facility] resolved. Low lying placenta. AC 49%, CL 3.1cm  Denies vaginal bleeding. 1/12/2022 at [facility]: Accelerated growth, WHITLEY WNL, BPP 8/8. History of macrosomia in infant in prior pregnancy, currently pregnant (6/7/2021)      Overview: 4/2020 vaginal delivery (10lb 5.8oz)      6/7/21 A1C-4.8      18 week GTT-152      3hr GTT- passed      28 week 3 hr GTT WNL            7/16/2021 at [facility]: Discussed with pt today that she may have had late       onset GDM with last pregnancy. MGM has DM2. She failed 1hr glucola and       past 3hrGTT. COVID-19 affecting pregnancy in third trimester (1/11/2022)      Overview: 1/6/22 She believes she may have a GI virus. She states her young child       has been vomiting all day. Pt states she now feels nauseous, and vomiting       began an hour ago. OVer the past hour she has vomited several 1/7/22 +       Covid            1/12/2022 at [facility]: N/V on 1/6, Positive 1/7. N/V continued until receiving       Zofran on [facility]/7 at Health system. Just has some congestion and sinus HA. Recommend ASA       daily.         Plan:     Reassuring fetal status, Continue plan for vaginal delivery   AROM clear large amount of clear fluid  Ok for pt to have DIVINA

## 2022-01-24 NOTE — PROGRESS NOTES
Patient ant lip and complaining of hot spot.  [facility] notified to assess epidural.    Dr. Sana Sanabria updated on SVE

## 2022-01-24 NOTE — PROGRESS NOTES
Patient requesting medications be converted to liquid because she has a difficult time swallowing pills. Dr. Leona Edouard notified and orders placed.

## 2022-01-24 NOTE — PROGRESS NOTES
Assist to restroom first time since delivery. Pt unable able to void. Linens changed and pt given fresh gown. Assist back to bed. Bladder emptied via straight catheter for 100 of urine. Pericare done. Educated on fall precautions. Pt stable. Instructed to call with needs.

## 2022-01-24 NOTE — PROGRESS NOTES
Delivery Note    Dr Quin Conner arrived to bedside at [phone]. Pt positioned for delivery and set up at 1603. Spontaneous vaginal delivery of viable male infant @ 26. Apgar's 8&9. Perineum repaired per MD.    See delivery summary for details.

## 2022-01-24 NOTE — PROGRESS NOTES
Pt admitted to room 434 for induction of labor. Pt accompanied by her , Sanjuanita Wesley. Pt oriented to room and plan of care reviewed. EFM and [place] applied to soft, non-tender abdomen. PIV started and labs to be drawn by lab. Consents signed and witnessed. Assessment complete. Pt situated in bed with call bell in reach. No questions or concerns noted at this time.

## 2022-01-24 NOTE — ANESTHESIA PROCEDURE NOTES
Epidural Block    Patient location during procedure: OB  Start time: 1/24/2022 11:40 AM  End time: 1/24/2022 11:47 AM  Reason for block: labor epidural  Staffing  Performed: attending   Anesthesiologist: Roberto Enrique MD  Preanesthetic Checklist  Completed: patient identified, IV checked, site marked, risks and benefits discussed, surgical consent, monitors and equipment checked, pre-op evaluation and timeout performed  Block Placement  Patient position: sitting  Prep: ChloraPrep  Sterility prep: cap, drape, gloves, hand and mask  Sedation level: no sedation  Patient monitoring: heart rate, frequent blood pressure checks and continuous pulse oximetry  Approach: midline  Location: lumbar  Lumbar location: L3-L4  Epidural  Loss of resistance technique: saline  Guidance: landmark technique  Needle  Needle type: Tuohy   Needle gauge: 17 G  Needle length: 9 cm  Needle insertion depth: 6 cm  Catheter type: multi-orifice  Catheter size: 19 G  Catheter at skin depth: 12 cm  Catheter securement method: surgical tape, liquid medical adhesive and clear occlusive dressing  Test dose: negative  Assessment  Block outcome: pain improved  Number of attempts: 1  Procedure assessment: patient tolerated procedure well with no immediate complications

## 2022-01-24 NOTE — ANESTHESIA POSTPROCEDURE EVALUATION
* No procedures listed *.    epidural    Anesthesia Post Evaluation      Multimodal analgesia: multimodal analgesia used between 6 hours prior to anesthesia start to PACU discharge  Patient location during evaluation: bedside  Patient participation: complete - patient participated  Level of consciousness: awake  Pain score: 2  Pain management: adequate  Airway patency: patent  Anesthetic complications: no  Cardiovascular status: acceptable  Respiratory status: acceptable  Hydration status: acceptable  Comments: Pt pleased with anesthetic. Appears comfortable. Post anesthesia nausea and vomiting:  none      INITIAL Post-op Vital signs:   Vitals Value Taken Time   /53 01/24/22 1602   Temp     Pulse 85 01/24/22 1602   Resp     SpO2     Vitals shown include unvalidated device data.

## 2022-01-24 NOTE — PROGRESS NOTES
[facility]      Dr Nati Molina at bedside at [phone]. MONALISA Collazo at bedside at 1134    Assisted pt to sitting up on bedside at 1140. Timeout completed at 1140 with MD, MONALISA and myself at bedside. Test dose given at 1144. Negative reaction. Dose given at 1147. Pt assisted to lying back in left tilt position. See anesthesia record for details. See vital sign flow sheet for BP. Tolerated procedure well.

## 2022-01-24 NOTE — L&D DELIVERY NOTE
Delivery Summary    Patient: Ravinder Kumar MRN: [id number]  SSN: [id number]    YOB: 1985  Age: 31 y.o.   Sex: female       Information for the patient's :  Marquis Sanchez [[id number]]       Labor Events:    Labor: No    Steroids: None   Cervical Ripening Date/Time:       Cervical Ripening Type: None   Antibiotics During Labor: No   Rupture Identifier:      Rupture Date/Time: 2022 10:13 AM   Rupture Type: AROM   Amniotic Fluid Volume: Large    Amniotic Fluid Description: Clear    Amniotic Fluid Odor: None    Induction: Oxytocin;AROM       Induction Date/Time: 2022 7:52 AM    Indications for Induction: Elective    Augmentation:     Augmentation Date/Time:      Indications for Augmentation:     Labor complications: None       Additional complications:        Delivery Events:  Indications For Episiotomy:     Episiotomy: None   Perineal Laceration(s): 2nd   Repaired:     Periurethral Laceration Location:      Repaired:     Labial Laceration Location:     Repaired:     Sulcal Laceration Location:     Repaired:     Vaginal Laceration Location:     Repaired:     Cervical Laceration Location:     Repaired:     Repair Suture: Chromic 2-0   Number of Repair Packets: 1   Estimated Blood Loss (ml):  ml   Quantitative Blood Loss (ml)                Delivery Date: 2022    Delivery Time: 4:04 PM  Delivery Type: Vaginal, Spontaneous  Sex:  Male    Gestational Age: 44w2d   Delivery Clinician:  Calvin Casas  Living Status: Living   Delivery Location: L&D 434          APGARS  One minute Five minutes Ten minutes   Skin color: 0   1        Heart rate: 2   2        Grimace: 2   2        Muscle tone: 2   2        Breathin   2        Totals: 8   9            Presentation: Vertex    Position: Right Occiput Anterior  Resuscitation Method:  Tactile Stimulation;Suctioning-bulb     Meconium Stained: None      Cord Information: 3 Vessels  Complications: Knot;Nuchal Cord With Compressions  Cord around: head  Delayed cord clamping? Yes  Cord clamped date/time:2022  4:06 PM  Disposition of Cord Blood: Lab    Blood Gases Sent?: Yes    Placenta:  Date/Time: 2022  4:08 PM  Removal: Spontaneous      Appearance: Normal     [place] Measurements:  Birth Weight: 9 lb 0.6 oz (4.1 kg)      Birth Length: 1' 9.65\" (0.55 m)      Head Circumference: 1' 3.35\" (0.39 m)      Chest Circumference: 1' 2.96\" (0.38 m)     Abdominal Girth: Other Providers:   GENEVA CORTES;JANICE WOOTEN;ELIAN MANRIQUE;JAIRO SELBY;RONALD KIRKPATRICK, Obstetrician;Primary Nurse;Primary  Nurse;Scrub Tech;Charge Nurse           Group B Strep: No results found for: GRBSEXT, GRBSEXT  Information for the patient's :  Jaci Roca [[id number]]   No results found for: ABORH, PCTABR, PCTDIG, BILI, ABORHEXT, ABORH     No results for input(s): PCO2CB, PO2CB, HCO3I, SO2I, IBD, PTEMPI, SPECTI, PHICB, ISITE, IDEV, IALLEN in the last 72 hours.  over small 2nd degree laceration with spontaneous expulsion of old inclusion cysts.  in LISA position without shoulder dystocia. Spontaneous delivery of placenta.  Fundus firm, bladder drained excellent hemostasis and cosmesis  Will stay on 162 of ASA per m

## 2022-01-25 PROCEDURE — 2709999900 HC NON-CHARGEABLE SUPPLY

## 2022-01-25 PROCEDURE — 74011250637 HC RX REV CODE- 250/637: Performed by: OBSTETRICS & GYNECOLOGY

## 2022-01-25 PROCEDURE — 65270000029 HC RM PRIVATE

## 2022-01-25 RX ORDER — HYDROCORTISONE ACETATE PRAMOXINE HCL 2.5; 1 G/100G; G/100G
CREAM TOPICAL AS NEEDED
Status: DISCONTINUED | OUTPATIENT
Start: 2022-01-25 | End: 2022-01-26 | Stop reason: HOSPADM

## 2022-01-25 RX ADMIN — HYDROCORTISONE ACETATE PRAMOXINE HCL: 2.5; 1 CREAM TOPICAL at 22:12

## 2022-01-25 RX ADMIN — WITCH HAZEL 1 PAD: 500 SOLUTION RECTAL; TOPICAL at 06:28

## 2022-01-25 RX ADMIN — IBUPROFEN 400 MG: 200 SUSPENSION ORAL at 17:32

## 2022-01-25 RX ADMIN — IBUPROFEN 400 MG: 200 SUSPENSION ORAL at 00:50

## 2022-01-25 RX ADMIN — ASPIRIN 81 MG 162 MG: 81 TABLET ORAL at 10:55

## 2022-01-25 RX ADMIN — OXYCODONE HYDROCHLORIDE 5 MG: 5 SOLUTION ORAL at 06:21

## 2022-01-25 RX ADMIN — IBUPROFEN 400 MG: 200 SUSPENSION ORAL at 10:54

## 2022-01-25 NOTE — ADT AUTH CERT NOTES
[facility]     FACILITY NPI :[id number]  FACILITY TAX ID :      [facility]  SFE 4 MOTHER INFANT  [address] [address] [phone]            DEPT CONTACT:  Maxime BHAKTA#[phone]  VTY#[phone]     Patient Name :Jacki Monteiro   : 1985 (36 yrs)  MRN : [id number]     Patient Mailing Address [address]                                          [address]41] , [address]                Insurance Plan Payor: BLUE CROSS / Plan: [place] OF [address] / Product Type: PPO /      Primary Coverage Subscriber ID : [id number]     Secondary Coverage:  N/A        Current Patient Class : INPATIENT  Admit Date : 2022     REQUESTED LEVEL OF CARE: INPATIENT [101]                                                           Diagnosis : Encounter for induction of labor                          ICD10 Code : Encounter for induction of labor [G11.07]  [O80] VAGINAL     Current Room and Bed 457/01     Admitting and Attending Info:  Admitting Provider : Kristin Payne DO   NPI: [id number]  Admitting Provider Phone. [phone]  Admitting Provider Address: [address] [place] [address]     Attending [facility]   [id number]  Attending Provider Address:  [address] [place] [address]     Attending Provider Phone: Woodrow wyatt phone: [phone]            BABY INFORMATION :     Name :  Nichole Perez    :   2020 (1 dy)      Delivery Type : Vaginal, Spontaneous [25*       Weight in Grams :   4100 g      GA  :   39      Apgar 1 Min :   8  [8]      Apgar 5 Min :   9  [9]      Unit:   SFE 4 MOTHER INFANT              MOM CHART-    Larissa Gaming     MRN: [id number]       Link to Baby  Comment        Baby's name MRN Account  Age Sex Admission Type   Rosalind Meigs [id number] 22 1 days M Confirmed Admission - L&D      OB History       3    Para   3    Term   3            AB        Living   3      SAB        IAB        Ectopic        Molar        Multiple   0    Live Births   3         # Outcome Date GA Labor/2nd Weight Sex Delivery Anes PTL Lv A1 A5   1 Term 17 39w3d 15h 00m / 1h 30m 3.33 kg F Vag-Spont Epidural  N Living 9 9   Name: Yuan Stephenson   Location: Other   Delivering Clinician: Hans Dumont MD      2 Term 20 39w2d / 1h 01m 4.7 kg M Vag-Spont Epidural  Living 9 10   Name: Fantasma Rosales   Location: Other   Delivering Clinician: Jordin Rider MD      3 Term 22 39w2d 7h 34m / 0h 38m 4.1 kg M Vag-Spont Epidural N Living 8 9   Name: Fantasma Rosales   Location: Other   Delivering Clinician: Ban Blunt,         Prenatal History     Most Recent Value   Did You Receive Prenatal Care Yes   Name Of OB Provider [facility]   Seen By MFM (Maternal Fetal Medicine)? Yes   Fetal Ultrasound Abnormalities/Concerns? No   Infant Feeding Breast Milk   Circumcision Planned Yes   Pediatrician After Birth/ Follow Up Baby Visits PS     Dating Summary    Working EDWIGE: 22 set by Roberto Gan RN on 21 based on Ultrasound on 21     Based On EDWIGE GA Diff GA User Date   Last Menstrual Period on 04/10/21 01/15/22 +2w0d  Roberto Gan RN 21   Ultrasound on 21 Working 6w2d Roberto Gan RN 21     Prenatal Results      Prenatal Labs    Test Value Date Time   ABO/Rh * A positive  21    Antibody Scrn * negative  21    Hgb * 13.6  21    Hct * 39.4  21    Platelets * 535      Rubella * immune  21    RPR * NR  21    T.  Pallidum Antibody      Urine * negative  21    Hep B Surf Ag * negative  21    Hep C * negative  21    HIV * NR  21    Gonorrhea Chlamydia      TSH      GTT, 1 HR (Glucola)      GTT, Fasting      GTT, 1 HR      GTT, 2 HR      GTT, 3 HR        3rd Trimester    Test Value Date Time   Hgb      Hct      Platelets      Group B Strep      Antibody Scrn      TSH      T. Pallidum Antibody      Hep B Surf Ag      Gonorrhea      Chlamydia         Screening/Diagnostics    Test Value Date Time   AFP Only      AFP Tetra      Hgb Electrophoresis      Amniocentesis      Cystic Fibrosis      Thalessemia      Nicholas-Sachs      Canavan      PAP Smear      Beta HCG      NT      NIPT      COVID-19        Lab    Test Value Date Time   GTT, Fasting      GTT, 1HR      GTT, 2HR      GTT, 3HR      RPR * NR  21    Beta HCG      CMV Ab      Toxoplasma Ab      Varicella Zoster Ab           Legend    *: Historical  View all results for this pregnancy       Jazmín Burgess [[id number]]      [place] Delivery    Birth date/time: 2020 16:04:00  Delivery type: Vaginal, Spontaneous  Complications: None    Labor Event Times    Labor onset date/time: 2022 0752  Dilation complete date/time: 2022 1526  Start pushing date/time: 2022 1551    Labor Events     labor?: No   steroids?: None  Antibiotics during labor?: No  Rupture date/time: 2022 1013  Rupture type: AROM  Fluid color: Clear  Fluid odor: None  Cervical ripening: None  Induction: Oxytocin, AROM  Induction date/time: 2022 1783  Induction indications: Elective  Complications: None    Delivery Providers    Delivering clinician: Sia Haley DO  Provider Role   Sia Haley DO Obstetrician   Eva Orr, RN Primary Nurse   Kishan Diaz, RN Primary [place] Nurse   Maria Luisa Arangoam Scrub Tech   Loreta Figueroa RN Charge Nurse     Anesthesia    Method: Epidural    Multiple Birth Onset Second Stage    Second Stage Start Date: 22 Second Stage Start Time:      Operative Delivery    Forceps attempted?: No  Vacuum extractor attempted?: No    Presentation    Presentation: Vertex  Position: Right Occiput Anterior    Apgars    Living status: Living  Apgars:  1 min. :  5 min.:  10 min. :  15 min.:  20 min.:    Skin color:  0  1       Heart rate:  2  2       Reflex irritability:  2  2       Muscle tone:  2  2       Respiratory effort:  2  2       Total:  8  9       Apgars assigned by: [facility]    Resuscitation    Method: Tactile Stimulation, Suctioning-bulb    Shoulder Dystocia    Shoulder dystocia present?: No    Measurements    Weight: 4100 g  Weight (lbs): 9 lb 0.6 oz  Length: 55 cm  Length (in): 21.65\"  Head circumference: 39 cm  Chest circumference: 38 cm    Lacerations    Episiotomy: None    Lacerations: 2nd  Repair Needed: Chromic 2-0  # of Repair Packets: 1  Suture Type and Size:   Suture Comment:   Estimated Blood Loss (mL):       Placenta    Placenta Date/Time: 1/24/2022 1608  Removal: Spontaneous  Appearance: Normal Placenta disposition: Discarded     Vaginal Counts            Delivery Summary History    Event User Date/Time   Signed Hai Mcclure DO 1/24/2022 16:23:35   Opened for Addendum Hai Mcclure DO 1/24/2022 16:23:50   Signed Hai Mcclure DO 1/24/2022 16:24:11

## 2022-01-25 NOTE — LACTATION NOTE
This note was copied from a baby's chart. Lactation visit. 3rd time mom, very experienced breastfeeder. This baby LGA, still in first 24 hours, not latching well, has been spitting up copious volume. Has not latched well all morning per mom and RN report. Attempting often but no latch. Pump taken into room and assisted mom with pump use due to baby spitting up and latching poorly. Showed mom pump parts, function and settings. Mom pumped x 15 minutes, no colostrum returned at first pump session. Reviewed hands on pumping technique. Pump again in 3 hours if no latch. Attempt latching first at all feeds. Will see how feeds and pumping progress.  RN updated

## 2022-01-25 NOTE — PROGRESS NOTES
SBAR OUT Report: Mother    Verbal report given to Lane Thrasher RN (full name & credentials) on this patient, who is now being transferred to MIU (unit) for routine progression of care. The patient is not wearing a green \"Anesthesia-Duramorph\" band. Report consisted of patient's Situation, Background, Assessment and Recommendations (SBAR).  ID bands were compared with the identification form, and verified with the patient and receiving nurse. Information from the SBAR, Intake/Output and MAR and the Pascual Report was reviewed with the receiving nurse; opportunity for questions and clarification provided. Dual fundal assessment completed with oncoming RN.

## 2022-01-25 NOTE — PROGRESS NOTES
Chart reviewed - no needs identified. SW met with patient while social distancing w/appropriate PPE. Patient without a PCP. Patient denied the need for assistance with obtaining a PCP. Patient denies any history of postpartum depression/anxiety. Patient given informational packet on  mood & anxiety disorders (resources/education). Family denies any additional needs from  at this time. Family has 's contact information should any needs/questions arise.     KIMBERLY Dominguez, [address] [facility]   [phone]

## 2022-01-25 NOTE — LACTATION NOTE

## 2022-01-25 NOTE — PROGRESS NOTES
Safety Teaching reviewed:   1. Hand hygiene prior to handling the infant. 2. Use of bulb syringe  3. Bracelets with matching numbers are placed on mother and infant  3. An infant security tag  [facility]) is placed on the infant's ankle and monitored  5. All OB nurses wear pink Employee badges - do not give your baby to anyone without proper identification. 6. Never leave the baby alone in the room. 7. The infant should be placed on their back to sleep. on a firm mattress. No toys should be placed in the crib. (safe sleep video offered to view)  8. Never shake the baby (video offered to view)  9. Infant fall prevention - do not sleep with the baby, and place the baby in the crib while ambulating. 8. Mother and Baby Care booklet given to Mother.

## 2022-01-25 NOTE — PROGRESS NOTES
SBAR IN Report: Mother    Verbal report received from Ashley See (full name & credentials) on this patient, who is now being transferred from Labor and Delivery (unit) for routine progression of care. The patient is wearing a green \"Anesthesia-Duramorph\" band. Report consisted of patient's Situation, Background, Assessment and Recommendations (SBAR).  ID bands were compared with the identification form, and verified with the patient and transferring nurse. Information from the SBAR and the Pascual Report was reviewed with the transferring nurse; opportunity for questions and clarification provided.

## 2022-01-25 NOTE — PROGRESS NOTES
Post-Partum Day Number 1 Progress Note    Patient doing well post-partum without significant complaint. Voiding without difficulty, normal lochia. Vitals:  Patient Vitals for the past 8 hrs:   BP Temp Pulse Resp SpO2   22 0736 103/67 97.6 °F (36.4 °C) 82 16 98 %     Temp (24hrs), Av °F (36.7 °C), Min:97.5 °F (36.4 °C), Max:98.6 °F (37 °C)      Vital signs stable, afebrile. Exam:  Patient without distress. Abdomen soft, fundus firm at level of umbilicus, nontender               Perineum with normal lochia noted. Lower extremities are negative for swelling, cords or tenderness. Labs:   Recent Results (from the past 24 hour(s))   BLOOD GAS, CORD BLOOD    Collection Time: 22  4:22 PM   Result Value Ref Range    pH, cord blood (POC) 7.32 7.15 - 7.38      pCO2 cord blood 52 32 - 68 mmHg    pO2 cord blood 18 mmHg    HCO3 (POC) 26.6 (H) 22 - 26 MMOL/L    sO2 (POC) 22.8 (L) 95 - 98 %    Base deficit (POC) 0.5 mmol/L    Specimen type (POC) ARTERIAL CORD      Performed by [address] [place] [address], CORD BLOOD    Collection Time: 22  4:22 PM   Result Value Ref Range    pH, cord blood (POC) 7.40 (H) 7.15 - 7.38      pCO2 cord blood 38 32 - 68 mmHg    pO2 cord blood 24 mmHg    HCO3, venous (POC) 23.3 23 - 28 MMOL/L    sO2, venous (POC) 42.9 (L) 65 - 88 %    Base deficit (POC) 1.2 mmol/L    Specimen type (POC) VENOUS CORD      Performed by [facility]        Assessment and Plan:  Patient appears to be having uncomplicated post-partum course. Continue routine perineal care and maternal education. Plan discharge tomorrow if no problems occur.

## 2022-01-26 VITALS
SYSTOLIC BLOOD PRESSURE: 105 MMHG | TEMPERATURE: 98.1 F | DIASTOLIC BLOOD PRESSURE: 63 MMHG | RESPIRATION RATE: 16 BRPM | OXYGEN SATURATION: 98 % | BODY MASS INDEX: 28.61 KG/M2 | HEIGHT: 66 IN | HEART RATE: 73 BPM | WEIGHT: 178 LBS

## 2022-01-26 PROCEDURE — 2709999900 HC NON-CHARGEABLE SUPPLY

## 2022-01-26 PROCEDURE — 74011250637 HC RX REV CODE- 250/637: Performed by: OBSTETRICS & GYNECOLOGY

## 2022-01-26 RX ORDER — HYDROCORTISONE ACETATE PRAMOXINE HCL 2.5; 1 G/100G; G/100G
CREAM TOPICAL AS NEEDED
Qty: 45 G | Refills: 1 | Status: SHIPPED | OUTPATIENT
Start: 2022-01-26

## 2022-01-26 RX ORDER — GUAIFENESIN 100 MG/5ML
162 LIQUID (ML) ORAL DAILY
Qty: 30 TABLET | Refills: 1 | Status: SHIPPED | OUTPATIENT
Start: 2022-01-27

## 2022-01-26 RX ORDER — TRIPROLIDINE/PSEUDOEPHEDRINE 2.5MG-60MG
600 TABLET ORAL
Qty: 1 EACH | Refills: 0 | Status: SHIPPED | OUTPATIENT
Start: 2022-01-26

## 2022-01-26 RX ADMIN — IBUPROFEN 400 MG: 200 SUSPENSION ORAL at 06:46

## 2022-01-26 RX ADMIN — IBUPROFEN 400 MG: 200 SUSPENSION ORAL at 00:23

## 2022-01-26 RX ADMIN — ASPIRIN 81 MG 162 MG: 81 TABLET ORAL at 09:27

## 2022-01-26 NOTE — LACTATION NOTE
This note was copied from a baby's chart. Individualized Feeding Plan for Breastfeeding   Lactation Services [phone]      As much as possible, hold your baby on your chest so babys bare skin is against your bare skin with a blanket covering babys back, especially 30 minutes before it is time for baby to eat. Watch for early feeding cues such as, licking lips, sucking motions, rooting, hands to mouth. Crying is a late feeding cue. Feed your baby at least 8 times in 24 hours, or more if your baby is showing feeding cues. If baby is sleepy put baby skin to skin and watch for hunger cues. To rouse baby: unwrap, undress, massage hands, feet, & back, change diaper, gently change babys position from lying to sitting. 15-20 minutes on the first breast of active breastfeeding is considered a good feeding. Good, active breastfeeding is when baby is alert, tugging the nipple, their ear may move, and you can hear swallows. Allow baby to finish the first side before changing sides. Sleeping at the breast or only brief, light sucks should not be considered a good, full breastfeed. At each feeding:  __x__1. Do Suck Practice on finger before each feeding until sucking pattern is smooth. Try using index finger. Nail down towards tongue. __x__2. Hand Express for a few minutes prior to latching to help start milk flow. __x__3. Baby needs to NURSE WELL x 15-20 minutes on at least first breast, burp and offer 2nd breast at every feeding. If no sustained latch only attempt at breast for 10 minutes. If baby does not latch on and feed well on at least one side, you should:   __x__4. Double pump for 15 minutes with breast massage and compression. Hand express for an additional 2-3 minutes per side. Pump after each feeding attempt or poor feeding, up to 8 times per day. If you are not putting baby to the breast you need to pump 8 times a day. Pump every 3 hours. __x__5.  Give baby all of the breast milk you obtain using a straight syringe or  curved syringe. If baby does NOT have enough wet and dirty diapers per day, is jaundiced/lethargic, or has significant weight loss AND you do NOT pump enough milk for each feeding (per volume listed below), formula supplementation may need to be used. Call lactation department /pediatrician if you have concerns. AVERAGE INTAKES OF COLOSTRUM BY HEALTHY  INFANTS:  Time  Day Intake (ml per feeding)  Based on 8 feedings per day. 1st 24 hrs  1 2-10 ml  24-48 hrs  2 5-15 ml  48-72 hrs  3 15-30 ml (0.5-1 oz)  72-96 hrs  4 30-45 ml (1-1.5oz)                          5-6      60-75 ml (2-2.5oz)                           7         90 ml (3oz) + more as desired    By day 7, baby will need 90 ml or 3 oz at each feeding based on 8 feedings per day & babys weight. (1oz = 30ml). Total milk volume needed in 24 hours by Day 7 is 24 oz per day based on baby's birthweight of 9lb 1oz. The more often baby eats, the less volume they need per feeding. If baby is eating more often than the minimum of 8 times per day, they may take less per feeding. Comments:   Use plan as needed if not latching well. If giving formula after a breastfeed, will also need to pump for 10 minutes. If baby does not latch and you just bottle feed, pump for 15 minutes. If pumping, suggest using olive oil or coconut oil on your nipples before pumping to help reduce the friction. Use feeding plan until follow up with pediatrician. Continue to attempt at the breast for most feeds. Pump every 3 hours if no latch. Give all pumped colostrum/breastmilk at each feeding. OUTPATIENT APPOINTMENT Suggested. Outpatient services are located on the 4th floor at [facility]. Check in at the 4th floor registration desk (the same one you used when you came to have your baby).   Call for questions [phone]

## 2022-01-26 NOTE — DISCHARGE SUMMARY
Obstetrical Discharge Summary     Name: Steffany Pickard MRN: [id number]  SSN: [id number]    YOB: 1985  Age: 31 y.o. Sex: female      Allergies: Patient has no known allergies. Admit Date: 2022    Discharge Date: 2022     Admitting Physician: Valentine Crystal DO     Attending Physician:  Devin Valencia DO     * Admission Diagnoses: Encounter for induction of labor [Z34.90]    * Discharge Diagnoses:   Information for the patient's :  Maximus Garland [[id number]]   Delivery of a 9 lb 0.6 oz (4.1 kg) male infant via Vaginal, Spontaneous on 2022 at 4:04 PM  by Susana Corrigan. Apgars were 8  and 9 . Additional Diagnoses:   Hospital Problems as of 2022 Date Reviewed: 2022          Codes Class Noted - Resolved POA    Encounter for induction of labor ICD-10-CM: Z34.90  ICD-9-CM: V22.1  2022 - Present Unknown        * (Principal) COVID-19 affecting pregnancy in third trimester ICD-10-CM: O98.513, U07.1  ICD-9-CM: 647.63, 079.89  2022 - Present Yes    Overview Addendum 2022 11:42 AM by Mariola Ely     22 She believes she may have a GI virus. She states her young child has been vomiting all day. Pt states she now feels nauseous, and vomiting began an hour ago. OVer the past hour she has vomited several 22 + Covid    2022 at [facility]: N/V on , Positive . N/V continued until receiving Zofran on  at [facility]. Just has some congestion and sinus HA. Recommend ASA daily. High-risk pregnancy in third trimester ICD-10-CM: O09.93  ICD-9-CM: V23.9  2021 - Present Yes    Overview Addendum 2022 12:03 PM by Mariola Ely     2021 at [facility]: Normal NT 1.4 mm, nasal bone seen. Pt reports that she will get [id number] done at her primary OB office at next visit. --> low risk  2021 at [facility]: Normal early anatomy; [facility] resolved. Low lying placenta. AC 49%, CL 3.1cm  Denies vaginal bleeding.   2022 at [facility]: Accelerated growth, WHITLEY WNL, BPP 8/8. History of macrosomia in infant in prior pregnancy, currently pregnant ICD-10-CM: O09.299  ICD-9-CM: V23.49  6/7/2021 - Present Yes    Overview Addendum 11/10/2021  9:53 AM by Derek SABA     4/2020 vaginal delivery (10lb 5.8oz)  6/7/21 A1C-4.8  18 week GTT-152  3hr GTT- passed  28 week 3 hr GTT WNL    7/16/2021 at [facility]: Discussed with pt today that she may have had late onset GDM with last pregnancy. MGM has DM2. She failed 1hr glucola and past 3hrGTT. Lab Results   Component Value Date/Time    ABO/Rh(D) A POSITIVE 01/24/2022 07:54 AM    Rubella, External immune 06/07/2021 12:00 AM    ABO,Rh A positive 06/07/2021 12:00 AM      Immunization History   Administered Date(s) Administered    Influenza Vaccine Kaymu) PF (>6 Mo Flulaval, Fluarix, and >3 Olene Butt [address]) 10/23/2019       * Procedures: vaginal delivery          * Discharge Condition: good    * Hospital Course: Normal hospital course following the delivery. * Disposition: Home    Discharge Medications:   Current Discharge Medication List      START taking these medications    Details   hydrocortisone-pramoxine (ANALPRAM-HC) 2.5-1 % (4g) cream Insert  into rectum as needed for Hemorrhoids. Qty: 45 g, Refills: 1  Start date: 1/26/2022      ibuprofen (ADVIL;MOTRIN) 100 mg/5 mL suspension Take 30 mL by mouth three (3) times daily as needed (mild pain). Qty: 1 Each, Refills: 0  Start date: 1/26/2022         CONTINUE these medications which have CHANGED    Details   aspirin 81 mg chewable tablet Take 2 Tablets by mouth daily. Qty: 30 Tablet, Refills: 1  Start date: 1/27/2022         CONTINUE these medications which have NOT CHANGED    Details   acetaminophen (TylenoL) 325 mg tablet Take  by mouth every four (4) hours as needed for Pain.      multivit 47/iron/folate 1/dha (PNV-DHA PO) Take  by mouth.          STOP taking these medications       guaifenesin/pseudoephedrne HCl (ROBITUSSIN PE PO) Comments:   Reason for Stopping:         ascorbic acid, vitamin C, (Vitamin C) 250 mg tablet Comments:   Reason for Stopping:         cholecalciferol, vitamin D3, (VITAMIN D3 PO) Comments:   Reason for Stopping:               * Follow-up Care/Patient Instructions:   Activity: No sex for 6 weeks  Diet: Regular Diet  Wound Care: As directed    Follow-up Information     Follow up With Specialties Details Why Contact Info    None    None ([phone]) Patient stated that they have no PCP

## 2022-01-26 NOTE — PROGRESS NOTES
Post-Partum Day Number 2 Progress Note    Patient doing well  without significant complaints. Pain controlled on current medication. Voiding without difficulty, normal lochia. Vitals:    Visit Vitals  /63 (BP 1 Location: Left arm, BP Patient Position: At rest)   Pulse 73   Temp 98.1 °F (36.7 °C)   Resp 16   Ht 5' 6\" (1.676 m)   Wt 178 lb (80.7 kg)   LMP 04/10/2021   SpO2 98%   Breastfeeding Unknown   BMI 28.73 kg/m²       Vital signs stable, afebrile. Exam:  Patient without distress. Heart rrr  Lungs cta b&s               Abdomen soft, fundus firm at level of umbilicus, nontender. Lower extremities are negative for swelling, cords or tenderness. Lab Results   Component Value Date/Time    ABO Group A 06/07/2021 03:55 PM    Rh (D) Positive 06/07/2021 03:55 PM    ABO/Rh(D) A POSITIVE 01/24/2022 07:54 AM        Lab Results   Component Value Date/Time    ABO/Rh(D) A POSITIVE 01/24/2022 07:54 AM    Antibody screen NEG 01/24/2022 07:54 AM    Antibody screen, External negative 06/07/2021 12:00 AM    Rubella, External immune 06/07/2021 12:00 AM    ABO,Rh A positive 06/07/2021 12:00 AM     Lab Results   Component Value Date/Time    HGB 10.5 (L) 01/24/2022 07:54 AM    Hgb, External 13.6 06/07/2021 12:00 AM         Assessment and Plan:  Patient appears to be having uncomplicated post-partum course. Continue routine post-delivery care and maternal education. Breastfeeding. Will discharge home.   On 162mg of asa per dr Brendan Adams note / mfm.

## 2022-01-26 NOTE — LACTATION NOTE
This note was copied from a baby's chart. In to see mom and infant for discharge. Mom states infant is latching and feeding well overall, but has slightly high jaundice. Discussed proactive plan w/ mom and she is going to \"insurance pump\" 10 minutes after most day time feeds next 1-2 days and feed back any extra breast milk. This can help improve jaundice level as well as sunlight. Encouraged dad to keep baby warm and not leave open to air in order to get sunlight so baby's temp does not drop. Gave feeding plan for guidance at home. So mom does not create large oversupply, based on how mom's milk is coming she can stop insurance pumping soon, see how follow up appt goes tomorrow. Mom feels her milk is starting to come in. Reviewed discharge info and how to manage period of engorgement. No further needs at this time.

## 2022-01-26 NOTE — DISCHARGE INSTRUCTIONS
Discharge instruction to follow: Activity: Pelvis rest for 6 weeks     No heavy lifting over 15 lbs for 2 weeks     No driving for 2 weeks     No push/pull motion such as sweeping or vacuuming for 2 weeks     No tub baths for 6 weeks    If using sitz bath continue until comfortable stopping. If using rakel-bottle continue to use until comfortable stopping. Change sanitary pad after each urination or bowel movement. Call MD for the following:      Fever over 101 F; pain not relieved by medication; foul smelling vaginal discharge or an increase in vaginal bleeding. Take medication as prescribed. Follow up with MD as order. Patient Education        Vaginal Childbirth: Care Instructions  Overview     Vaginal birth means delivering a baby through the birth canal (vagina). During labor, the uterus tightens (contracts) regularly to thin and open the cervix and to push the baby out through the birth canal.  Your body will slowly heal in the next few weeks. It's easy to get too tired and overwhelmed during the first weeks after your baby is born. Changes in your hormones can shift your mood without warning. You may find it hard to meet the extra demands on your energy and time. Take it easy on yourself. Follow-up care is a key part of your treatment and safety. Be sure to make and go to all appointments, and call your doctor if you are having problems. It's also a good idea to know your test results and keep a list of the medicines you take. How can you care for yourself at home? Vaginal bleeding and cramps  · After delivery, you will have a bloody discharge from your vagina. This will turn pink within a week and then white or yellow after about 10 days. It may last for 2 to 4 weeks or longer, until the uterus has healed. Use sanitary pads until you stop bleeding. Using pads makes it easier to monitor your bleeding. · Don't worry if you pass some blood clots, as long as they are smaller than a golf ball.  If you have a tear or stitches in your vaginal area, change the pad at least every 4 hours. This will help prevent soreness and infection. · You may have cramps for the first few days after childbirth. These are normal and occur as the uterus shrinks to normal size. Take an over-the-counter pain medicine, such as acetaminophen (Tylenol), ibuprofen (Advil, Motrin), or naproxen (Aleve), for cramps. Read and follow all instructions on the label. Do not take aspirin, because it can cause more bleeding. · Do not take two or more pain medicines at the same time unless the doctor told you to. Many pain medicines have acetaminophen, which is Tylenol. Too much acetaminophen (Tylenol) can be harmful. Stitches  · If you have stitches, they will dissolve on their own and don't need to be removed. Follow your doctor's instructions for cleaning the stitched area. · Put ice or a cold pack on your painful area for 10 to 20 minutes at a time, several times a day, for the first few days. Put a thin cloth between the ice and your skin. · Sit in a few inches of warm water (sitz bath) 3 times a day and after bowel movements. The warm water helps with pain and itching. If you don't have a tub, a warm shower might help. Breast fullness  · Your breasts may overfill (engorge) in the first few days after delivery. To help milk flow and to relieve pain, warm your breasts in the shower or by using warm, moist towels before nursing. · If you aren't nursing, don't put warmth on your breasts or touch your breasts. Wear a bra that fits well and use ice until the fullness goes away. This usually takes 2 to 3 days. · Put ice or a cold pack on your breast after nursing to reduce swelling and pain. Put a thin cloth between the ice and your skin. Activity  · Eat a balanced diet. Don't try to lose weight by cutting calories. Keep taking your prenatal vitamins, or take a multivitamin. · Get as much rest as you can.  Try to take naps when your baby sleeps during the day. · Get some exercise every day. But don't do any heavy exercise until your doctor says it is okay. · Wait until you are healed (about 4 to 6 weeks) before you have sexual intercourse. Your doctor will tell you when it is okay to have sex. · If you don't want to get pregnant, talk to your doctor about birth control. You can get pregnant even before your period returns. Also, you can get pregnant while you are breastfeeding. Mental health  · It's normal to have some sadness, anxiety, sleeplessness, and mood swings after you go home. If you feel upset or hopeless for more than a few days or are having trouble doing the things you need to do, talk to your doctor. Constipation and hemorrhoids  · Drink plenty of fluids. If you have kidney, heart, or liver disease and have to limit fluids, talk with your doctor before you increase the amount of fluids you drink. · Eat plenty of fiber each day. Have a bran muffin or bran cereal for breakfast. Try eating a piece of fruit for a mid-afternoon snack. · For painful, itchy hemorrhoids, put ice or a cold pack on the area several times a day for 10 minutes at a time. Follow this by putting a warm compress on the area for another 10 to 20 minutes or by sitting in a shallow, warm bath. When should you call for help? Call 911  anytime you think you may need emergency care. For example, call if:    · You have thoughts of harming yourself, your baby, or another person.     · You passed out (lost consciousness).     · You have chest pain, are short of breath, or cough up blood.     · You have a seizure.    Call your doctor now or seek immediate medical care if:    · You have severe vaginal bleeding.     · You are dizzy or lightheaded, or you feel like you may faint.     · You have a fever.     · You have new or more pain in your belly or pelvis.     · You have symptoms of a blood clot in your leg (called a deep vein thrombosis), such as:  ? Pain in the calf, back of the knee, thigh, or groin. ? Redness and swelling in your leg or groin.     · You have signs of preeclampsia, such as:  ? Sudden swelling of your face, hands, or feet. ? New vision problems (such as dimness, blurring, or seeing spots). ? A severe headache. Watch closely for changes in your health, and be sure to contact your doctor if:    · Your vaginal bleeding seems to be getting heavier.     · You have new or worse vaginal discharge.     · You feel sad, anxious, or hopeless for more than a few days.     · You do not get better as expected. Where can you learn more? Go to http://www.gray.com/  Enter Q237 in the search box to learn more about \"Vaginal Childbirth: Care Instructions. \"  Current as of: June 16, 2021               Content Version: 13.0  © 5011-4242 [employer]. Care instructions adapted under license by [employer] (which disclaims liability or warranty for this information). If you have questions about a medical condition or this instruction, always ask your healthcare professional. [place] [address] any warranty or liability for your use of this information.

## 2022-01-26 NOTE — LACTATION NOTE

## 2022-02-09 PROBLEM — O09.299 HISTORY OF MACROSOMIA IN INFANT IN PRIOR PREGNANCY, CURRENTLY PREGNANT: Status: RESOLVED | Noted: 2021-06-07 | Resolved: 2022-02-09

## 2022-02-09 PROBLEM — O26.893 PREGNANCY HEADACHE IN THIRD TRIMESTER: Status: RESOLVED | Noted: 2021-06-07 | Resolved: 2022-02-09

## 2022-02-09 PROBLEM — R51.9 PREGNANCY HEADACHE IN THIRD TRIMESTER: Status: RESOLVED | Noted: 2021-06-07 | Resolved: 2022-02-09

## 2022-02-09 PROBLEM — Z98.890 HISTORY OF LEEP (LOOP ELECTROSURGICAL EXCISION PROCEDURE) OF CERVIX COMPLICATING PREGNANCY: Status: RESOLVED | Noted: 2021-06-07 | Resolved: 2022-02-09

## 2022-02-09 PROBLEM — U07.1 COVID-19 AFFECTING PREGNANCY IN THIRD TRIMESTER: Status: RESOLVED | Noted: 2022-01-11 | Resolved: 2022-02-09

## 2022-02-09 PROBLEM — Z34.90 ENCOUNTER FOR INDUCTION OF LABOR: Status: RESOLVED | Noted: 2022-01-24 | Resolved: 2022-02-09

## 2022-02-09 PROBLEM — O34.40 HISTORY OF LEEP (LOOP ELECTROSURGICAL EXCISION PROCEDURE) OF CERVIX COMPLICATING PREGNANCY: Status: RESOLVED | Noted: 2021-06-07 | Resolved: 2022-02-09

## 2022-02-09 PROBLEM — O98.513 COVID-19 AFFECTING PREGNANCY IN THIRD TRIMESTER: Status: RESOLVED | Noted: 2022-01-11 | Resolved: 2022-02-09

## 2022-11-18 ENCOUNTER — TELEPHONE (OUTPATIENT)
Dept: FAMILY MEDICINE CLINIC | Facility: CLINIC | Age: 37
End: 2022-11-18

## 2022-11-18 NOTE — TELEPHONE ENCOUNTER
----- Message from [facility] sent at 11/17/2022  2:30 PM EST -----  Subject: Message to Provider    QUESTIONS  Information for Provider? pt would like to get a pap with her physical   ---------------------------------------------------------------------------  --------------  [id number]  [phone]; OK to leave message on voicemail  ---------------------------------------------------------------------------  --------------  SCRIPT ANSWERS  Relationship to Patient?  Self

## 2022-11-23 ENCOUNTER — OFFICE VISIT (OUTPATIENT)
Dept: FAMILY MEDICINE CLINIC | Facility: CLINIC | Age: 37
End: 2022-11-23
Payer: COMMERCIAL

## 2022-11-23 VITALS
SYSTOLIC BLOOD PRESSURE: 116 MMHG | WEIGHT: 132 LBS | DIASTOLIC BLOOD PRESSURE: 66 MMHG | HEIGHT: 66 IN | OXYGEN SATURATION: 99 % | BODY MASS INDEX: 21.21 KG/M2 | HEART RATE: 80 BPM

## 2022-11-23 DIAGNOSIS — N91.2 ABSENT MENSES: ICD-10-CM

## 2022-11-23 DIAGNOSIS — Z23 ENCOUNTER FOR IMMUNIZATION: ICD-10-CM

## 2022-11-23 DIAGNOSIS — Z12.4 SCREENING FOR CERVICAL CANCER: ICD-10-CM

## 2022-11-23 DIAGNOSIS — R53.83 FATIGUE, UNSPECIFIED TYPE: Primary | ICD-10-CM

## 2022-11-23 DIAGNOSIS — Z00.00 LABORATORY EXAM ORDERED AS PART OF ROUTINE GENERAL MEDICAL EXAMINATION: ICD-10-CM

## 2022-11-23 DIAGNOSIS — R68.82 DECREASED LIBIDO: ICD-10-CM

## 2022-11-23 LAB
BASOPHILS # BLD: 0.1 K/UL (ref 0–0.2)
BASOPHILS NFR BLD: 1 % (ref 0–2)
DIFFERENTIAL METHOD BLD: NORMAL
EOSINOPHIL # BLD: 0.1 K/UL (ref 0–0.8)
EOSINOPHIL NFR BLD: 2 % (ref 0.5–7.8)
ERYTHROCYTE [DISTWIDTH] IN BLOOD BY AUTOMATED COUNT: 12.4 % (ref 11.9–14.6)
HCG, PREGNANCY, URINE, POC: NEGATIVE
HCT VFR BLD AUTO: 41.9 % (ref 35.8–46.3)
HGB BLD-MCNC: 14 G/DL (ref 11.7–15.4)
IMM GRANULOCYTES # BLD AUTO: 0 K/UL (ref 0–0.5)
IMM GRANULOCYTES NFR BLD AUTO: 0 % (ref 0–5)
LYMPHOCYTES # BLD: 2.5 K/UL (ref 0.5–4.6)
LYMPHOCYTES NFR BLD: 44 % (ref 13–44)
MCH RBC QN AUTO: 30 PG (ref 26.1–32.9)
MCHC RBC AUTO-ENTMCNC: 33.4 G/DL (ref 31.4–35)
MCV RBC AUTO: 89.7 FL (ref 82–102)
MONOCYTES # BLD: 0.3 K/UL (ref 0.1–1.3)
MONOCYTES NFR BLD: 5 % (ref 4–12)
NEUTS SEG # BLD: 2.7 K/UL (ref 1.7–8.2)
NEUTS SEG NFR BLD: 48 % (ref 43–78)
NRBC # BLD: 0 K/UL (ref 0–0.2)
PLATELET # BLD AUTO: 209 K/UL (ref 150–450)
PMV BLD AUTO: 10.6 FL (ref 9.4–12.3)
RBC # BLD AUTO: 4.67 M/UL (ref 4.05–5.2)
VALID INTERNAL CONTROL, POC: YES
WBC # BLD AUTO: 5.6 K/UL (ref 4.3–11.1)

## 2022-11-23 PROCEDURE — 99203 OFFICE O/P NEW LOW 30 MIN: CPT | Performed by: NURSE PRACTITIONER

## 2022-11-23 ASSESSMENT — PATIENT HEALTH QUESTIONNAIRE - PHQ9
SUM OF ALL RESPONSES TO PHQ9 QUESTIONS 1 & 2: 0
SUM OF ALL RESPONSES TO PHQ QUESTIONS 1-9: 0
6. FEELING BAD ABOUT YOURSELF - OR THAT YOU ARE A FAILURE OR HAVE LET YOURSELF OR YOUR FAMILY DOWN: 0
1. LITTLE INTEREST OR PLEASURE IN DOING THINGS: 0
9. THOUGHTS THAT YOU WOULD BE BETTER OFF DEAD, OR OF HURTING YOURSELF: 0
4. FEELING TIRED OR HAVING LITTLE ENERGY: 0
SUM OF ALL RESPONSES TO PHQ QUESTIONS 1-9: 0
SUM OF ALL RESPONSES TO PHQ QUESTIONS 1-9: 0
5. POOR APPETITE OR OVEREATING: 0
8. MOVING OR SPEAKING SO SLOWLY THAT OTHER PEOPLE COULD HAVE NOTICED. OR THE OPPOSITE, BEING SO FIGETY OR RESTLESS THAT YOU HAVE BEEN MOVING AROUND A LOT MORE THAN USUAL: 0
10. IF YOU CHECKED OFF ANY PROBLEMS, HOW DIFFICULT HAVE THESE PROBLEMS MADE IT FOR YOU TO DO YOUR WORK, TAKE CARE OF THINGS AT HOME, OR GET ALONG WITH OTHER PEOPLE: 0
SUM OF ALL RESPONSES TO PHQ QUESTIONS 1-9: 0
2. FEELING DOWN, DEPRESSED OR HOPELESS: 0
3. TROUBLE FALLING OR STAYING ASLEEP: 0
7. TROUBLE CONCENTRATING ON THINGS, SUCH AS READING THE NEWSPAPER OR WATCHING TELEVISION: 0

## 2022-11-23 ASSESSMENT — ANXIETY QUESTIONNAIRES
GAD7 TOTAL SCORE: 3
4. TROUBLE RELAXING: 0
IF YOU CHECKED OFF ANY PROBLEMS ON THIS QUESTIONNAIRE, HOW DIFFICULT HAVE THESE PROBLEMS MADE IT FOR YOU TO DO YOUR WORK, TAKE CARE OF THINGS AT HOME, OR GET ALONG WITH OTHER PEOPLE: NOT DIFFICULT AT ALL
6. BECOMING EASILY ANNOYED OR IRRITABLE: 0
1. FEELING NERVOUS, ANXIOUS, OR ON EDGE: 1
7. FEELING AFRAID AS IF SOMETHING AWFUL MIGHT HAPPEN: 1
3. WORRYING TOO MUCH ABOUT DIFFERENT THINGS: 0
2. NOT BEING ABLE TO STOP OR CONTROL WORRYING: 1
5. BEING SO RESTLESS THAT IT IS HARD TO SIT STILL: 0

## 2022-11-23 NOTE — PROGRESS NOTES
[address]  Phone: [phone] Fax [phone]  Jose Murrell. Juan MS, APRN, FNP-C  11/23/22  Chief Complaint   Patient presents with    New Patient     Pt here today to [facility]. Pt denies having a recent PCP. Pt denies having any sig PMH and takes no medications. 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 Pt is breastfeeding the 9 month old. Pt reports that she has not yet gotten her menses back due breastfeeding. Pt reports that overall she feels well, but wishes to discuss some ongoing fatigue and low libido. ASSESSMENT/PLAN:  Below is the assessment and plan developed based on review of pertinent history, physical exam, labs, studies, and medications. 1. Fatigue, unspecified type  Pt reports having some ongoing fatigue. Pt feels that it is likely related to being a stay at home mom to 3 young children. Discussed with pt. Pt encouraged to try to exercise more regularly and try to get regular sleep when possible. Will check CBC, CMP, Vitamin D, Vitamin B12, and TFT's today to further evaluate as well. Pt to f/u with me in 4 weeks. Will monitor.   - CBC with Auto Differential; Future  - Comprehensive Metabolic Panel; Future  - Vitamin D 25 Hydroxy; Future  - Vitamin B12; Future  - TSH with Reflex; Future    2. Decreased libido  Pt reports having some decreased libido. Pt denies any depression or anxiety. Reports having good relationship with her . Discussed with pt. Will check TFT's. If normal, will have pt discuss with GYN at [facility] as she will likely need hormonal workup. Pt to f/u with me in 4 weeks. Will monitor.   - TSH with Reflex; Future    3. Absent menses  4. Mother currently breast-feeding  Pt is breastfeeding the 9 month old. Pt reports that she has not yet gotten her menses back due breastfeeding. Urine pregnancy test negative in office today.  Discussed with Past Surgical History:   Procedure Laterality Date    COLPOSCOPY      LEEP      OTHER SURGICAL HISTORY      Jaw surgery     Family History   Problem Relation Age of Onset    Pancreatic Cancer Paternal Grandmother         Pancreatic    No Known Problems Maternal Grandfather     Heart Attack Father     No Known Problems Mother     Diabetes Maternal Grandmother     No Known Problems Paternal Grandfather      Social History     Tobacco Use   Smoking Status Never   Smokeless Tobacco Never         Review of Systems   Constitutional:  Positive for fatigue. Negative for appetite change, chills, diaphoresis, fever and unexpected weight change. HENT: Negative. Negative for congestion, dental problem, drooling, ear discharge, ear pain, facial swelling, hearing loss, mouth sores, nosebleeds, postnasal drip, rhinorrhea, sinus pressure, sinus pain, sneezing, sore throat, tinnitus, trouble swallowing and voice change. Eyes: Negative. Negative for pain, discharge and visual disturbance. Respiratory: Negative. Negative for apnea, cough, choking, chest tightness, shortness of breath, wheezing and stridor. Cardiovascular: Negative. Negative for chest pain, palpitations and leg swelling. Gastrointestinal: Negative. Negative for abdominal distention, abdominal pain, anal bleeding, blood in stool, constipation, diarrhea, nausea, rectal pain and vomiting. Endocrine: Negative. Negative for cold intolerance, heat intolerance, polydipsia, polyphagia and polyuria. Genitourinary:  Negative for decreased urine volume, difficulty urinating, dyspareunia, dysuria, flank pain, frequency, genital sores, hematuria, menstrual problem, pelvic pain, urgency, vaginal bleeding, vaginal discharge and vaginal pain. Low libido   Musculoskeletal: Negative. Negative for arthralgias, back pain, gait problem, joint swelling, myalgias, neck pain and neck stiffness. Skin: Negative.   Negative for color change, pallor, rash and wound. Allergic/Immunologic: Negative. Negative for environmental allergies. Neurological: Negative. Negative for dizziness, tremors, seizures, syncope, facial asymmetry, speech difficulty, weakness, light-headedness, numbness and headaches. Hematological: Negative. Negative for adenopathy. Does not bruise/bleed easily. Psychiatric/Behavioral: Negative. Negative for dysphoric mood, hallucinations, self-injury, sleep disturbance and suicidal ideas. The patient is not nervous/anxious. Vitals:    11/23/22 1029   BP: 116/66   Pulse: 80   SpO2: 99%       Physical Exam  Vitals reviewed. Constitutional:       General: She is not in acute distress. Appearance: Normal appearance. She is normal weight. She is not ill-appearing, toxic-appearing or diaphoretic. HENT:      Head: Normocephalic and atraumatic. Right Ear: Tympanic membrane, ear canal and external ear normal. There is no impacted cerumen. Left Ear: Tympanic membrane, ear canal and external ear normal. There is no impacted cerumen. Nose: Nose normal. No congestion or rhinorrhea. Mouth/Throat:      Mouth: Mucous membranes are moist.      Pharynx: Oropharynx is clear. No oropharyngeal exudate or posterior oropharyngeal erythema. Eyes:      General: No scleral icterus. Right eye: No discharge. Left eye: No discharge. Extraocular Movements: Extraocular movements intact. Conjunctiva/sclera: Conjunctivae normal.      Pupils: Pupils are equal, round, and reactive to light. Cardiovascular:      Rate and Rhythm: Normal rate and regular rhythm. Pulses: Normal pulses. Heart sounds: Normal heart sounds. No murmur heard. No friction rub. No gallop. Pulmonary:      Effort: Pulmonary effort is normal. No respiratory distress. Breath sounds: Normal breath sounds. No stridor. No wheezing, rhonchi or rales. Chest:      Chest wall: No tenderness. Abdominal:      General: Abdomen is flat. Bowel sounds are normal. There is no distension. Palpations: Abdomen is soft. There is no mass. Tenderness: There is no abdominal tenderness. There is no right CVA tenderness, left CVA tenderness, guarding or rebound. Hernia: No hernia is present. Musculoskeletal:         General: No swelling, tenderness, deformity or signs of injury. Normal range of motion. Cervical back: Normal range of motion and neck supple. No rigidity or tenderness. Right lower leg: No edema. Left lower leg: No edema. Comments: Gait steady and unassisted   Lymphadenopathy:      Cervical: No cervical adenopathy. Skin:     General: Skin is warm. Capillary Refill: Capillary refill takes less than 2 seconds. Coloration: Skin is not jaundiced or pale. Findings: No bruising, erythema, lesion or rash. Neurological:      General: No focal deficit present. Mental Status: She is alert and oriented to person, place, and time. Cranial Nerves: No cranial nerve deficit. Sensory: No sensory deficit. Motor: No weakness. Coordination: Coordination normal.      Gait: Gait normal.   Psychiatric:         Mood and Affect: Mood normal.         Behavior: Behavior normal.         Thought Content:  Thought content normal.         Judgment: Judgment normal.     PHQ-9 Total Score: 0 (11/23/2022 10:22 AM)  Thoughts that you would be better off dead, or of hurting yourself in some way: 0 (11/23/2022 10:22 AM)  URBANO-7 SCREENING 11/23/2022   Feeling nervous, anxious, or on edge Several days   Not being able to stop or control worrying Several days   Worrying too much about different things Not at all   Trouble relaxing Not at all   Being so restless that it is hard to sit still Not at all   Becoming easily annoyed or irritable Not at all   Feeling afraid as if something awful might happen Several days   URBANO-7 Total Score 3   How difficult have these problems made it for you to do your work, take care of things at home, or get along with other people? Not difficult at all        Component      Latest Ref Rng & Units 11/23/2022          11:16 AM   Valid Internal Control, POC       yes   HCG, Pregnancy, Urine, POC      Negative Negative     PLEASE NOTE:  This document has been produced using voice recognition software. Unrecognized errors in transcription may be present. An electronic signature was used to authenticate this note.   -- NATAILE Lopez - NP

## 2022-11-24 LAB
25(OH)D3 SERPL-MCNC: 29.2 NG/ML (ref 30–100)
ALBUMIN SERPL-MCNC: 4.7 G/DL (ref 3.5–5)
ALBUMIN/GLOB SERPL: 1.4 {RATIO} (ref 0.4–1.6)
ALP SERPL-CCNC: 73 U/L (ref 50–136)
ALT SERPL-CCNC: 20 U/L (ref 12–65)
ANION GAP SERPL CALC-SCNC: 4 MMOL/L (ref 2–11)
AST SERPL-CCNC: 13 U/L (ref 15–37)
BILIRUB SERPL-MCNC: 0.5 MG/DL (ref 0.2–1.1)
BUN SERPL-MCNC: 14 MG/DL (ref 6–23)
CALCIUM SERPL-MCNC: 9.3 MG/DL (ref 8.3–10.4)
CHLORIDE SERPL-SCNC: 105 MMOL/L (ref 101–110)
CHOLEST SERPL-MCNC: 155 MG/DL
CO2 SERPL-SCNC: 30 MMOL/L (ref 21–32)
CREAT SERPL-MCNC: 0.7 MG/DL (ref 0.6–1)
GLOBULIN SER CALC-MCNC: 3.3 G/DL (ref 2.8–4.5)
GLUCOSE SERPL-MCNC: 84 MG/DL (ref 65–100)
HDLC SERPL-MCNC: 49 MG/DL (ref 40–60)
HDLC SERPL: 3.2 {RATIO}
LDLC SERPL CALC-MCNC: 94.4 MG/DL
POTASSIUM SERPL-SCNC: 4.1 MMOL/L (ref 3.5–5.1)
PROT SERPL-MCNC: 8 G/DL (ref 6.3–8.2)
SODIUM SERPL-SCNC: 139 MMOL/L (ref 133–143)
TRIGL SERPL-MCNC: 58 MG/DL (ref 35–150)
TSH W FREE THYROID IF ABNORMAL: 1.41 UIU/ML (ref 0.36–3.74)
VIT B12 SERPL-MCNC: 416 PG/ML (ref 193–986)
VLDLC SERPL CALC-MCNC: 11.6 MG/DL (ref 6–23)

## 2022-11-25 DIAGNOSIS — E55.9 VITAMIN D INSUFFICIENCY: Primary | ICD-10-CM

## 2022-11-25 RX ORDER — CHOLECALCIFEROL (VITAMIN D3) 50 MCG
2000 TABLET ORAL DAILY
Qty: 30 TABLET | Refills: 2 | Status: SHIPPED | OUTPATIENT
Start: 2022-11-25

## 2022-11-25 ASSESSMENT — ENCOUNTER SYMPTOMS
DIARRHEA: 0
STRIDOR: 0
VOMITING: 0
FACIAL SWELLING: 0
RHINORRHEA: 0
EYE DISCHARGE: 0
NAUSEA: 0
CONSTIPATION: 0
RECTAL PAIN: 0
SINUS PRESSURE: 0
CHOKING: 0
GASTROINTESTINAL NEGATIVE: 1
RESPIRATORY NEGATIVE: 1
ABDOMINAL PAIN: 0
SINUS PAIN: 0
COLOR CHANGE: 0
VOICE CHANGE: 0
CHEST TIGHTNESS: 0
BLOOD IN STOOL: 0
ALLERGIC/IMMUNOLOGIC NEGATIVE: 1
ANAL BLEEDING: 0
ABDOMINAL DISTENTION: 0
SORE THROAT: 0
APNEA: 0
EYE PAIN: 0
SHORTNESS OF BREATH: 0
EYES NEGATIVE: 1
WHEEZING: 0
TROUBLE SWALLOWING: 0
BACK PAIN: 0
COUGH: 0

## 2022-12-22 ENCOUNTER — OFFICE VISIT (OUTPATIENT)
Dept: FAMILY MEDICINE CLINIC | Facility: CLINIC | Age: 37
End: 2022-12-22

## 2022-12-22 VITALS
RESPIRATION RATE: 18 BRPM | SYSTOLIC BLOOD PRESSURE: 118 MMHG | DIASTOLIC BLOOD PRESSURE: 60 MMHG | HEIGHT: 66 IN | HEART RATE: 96 BPM | WEIGHT: 132.6 LBS | OXYGEN SATURATION: 96 % | BODY MASS INDEX: 21.31 KG/M2

## 2022-12-22 DIAGNOSIS — N91.2 ABSENT MENSES: ICD-10-CM

## 2022-12-22 DIAGNOSIS — R68.82 DECREASED LIBIDO: ICD-10-CM

## 2022-12-22 DIAGNOSIS — R09.82 POST-NASAL DRIP: ICD-10-CM

## 2022-12-22 DIAGNOSIS — R53.83 FATIGUE, UNSPECIFIED TYPE: ICD-10-CM

## 2022-12-22 DIAGNOSIS — J34.89 SINUS PRESSURE: ICD-10-CM

## 2022-12-22 DIAGNOSIS — R09.81 NASAL CONGESTION: ICD-10-CM

## 2022-12-22 DIAGNOSIS — Z12.4 SCREENING FOR CERVICAL CANCER: ICD-10-CM

## 2022-12-22 DIAGNOSIS — Z00.00 ROUTINE PHYSICAL EXAMINATION: Primary | ICD-10-CM

## 2022-12-22 DIAGNOSIS — E55.9 VITAMIN D INSUFFICIENCY: ICD-10-CM

## 2022-12-22 DIAGNOSIS — Z23 ENCOUNTER FOR IMMUNIZATION: ICD-10-CM

## 2022-12-22 DIAGNOSIS — J01.00 ACUTE MAXILLARY SINUSITIS, RECURRENCE NOT SPECIFIED: ICD-10-CM

## 2022-12-22 RX ORDER — AMOXICILLIN AND CLAVULANATE POTASSIUM 875; 125 MG/1; MG/1
1 TABLET, FILM COATED ORAL 2 TIMES DAILY
Qty: 20 TABLET | Refills: 0 | Status: SHIPPED | OUTPATIENT
Start: 2022-12-22 | End: 2023-01-01

## 2022-12-22 ASSESSMENT — ENCOUNTER SYMPTOMS
CHOKING: 0
VOICE CHANGE: 0
SHORTNESS OF BREATH: 0
RHINORRHEA: 1
SINUS PRESSURE: 1
COUGH: 0
ABDOMINAL PAIN: 0
CONSTIPATION: 0
APNEA: 0
STRIDOR: 0
RECTAL PAIN: 0
SORE THROAT: 1
ABDOMINAL DISTENTION: 0
ANAL BLEEDING: 0
WHEEZING: 0
TROUBLE SWALLOWING: 0
CHEST TIGHTNESS: 0
EYE DISCHARGE: 0
NAUSEA: 0
BLOOD IN STOOL: 0
EYES NEGATIVE: 1
SINUS PAIN: 1
BACK PAIN: 0
COLOR CHANGE: 0
DIARRHEA: 0
GASTROINTESTINAL NEGATIVE: 1
EYE PAIN: 0
ALLERGIC/IMMUNOLOGIC NEGATIVE: 1
FACIAL SWELLING: 0
VOMITING: 0
RESPIRATORY NEGATIVE: 1

## 2022-12-22 NOTE — PROGRESS NOTES
[address], [address] [place] [address]  Phone: [phone] Fax [phone]  Ervin VillarealBj Martinez MS, APRN, FNP-C  12/22/2022   Chief Complaint   Patient presents with    Annual Exam     Pt here today for routine physical/lab review and to recheck ongoing fatigue/Vitamin D insufficiency, decreased libido, absent menses. Pt reports following previous POC as directed. Please see ROS/Assessment and Plan section for full details regarding these issues. Pt has one additional main concern today.  Sinusitis     Pt reports starting a week ago with nasal congestion with pale yellow d/c, bilat maxillary sinus pressure/pain, scratchy sore throat-PND. Pt denies any fever, chills, BA's, malaise, cough, SOB, wheezing, CP, N/V/D/abd pain associated. Pt denies having Covid or Flu vax. Pt denies taking anything OTC for the symptoms. Reports symptoms have worsened over past few days. ASSESSMENT/PLAN:  Below is the assessment and plan developed based on review of pertinent history, physical exam, labs, studies, and medications. 1. Routine physical examination  Routine physical exam completed today. Age specific anticipatory guidance given. See below for issues addressed during today's visit. 2. Fatigue, unspecified type  3. Vitamin D insufficiency  Pt reports having ongoing fatigue. On 11/23/22, pt's CBC, CMP, Vitamin B12 and TFT's were normal. Vitamin D level was in insufficient range at 29.2 Pt was started on Vitamin D 2000 units po daily. Pt reports taking as directed but has not noticed much difference yet. 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 Pt denies any depression or anxiety. Discussed with pt. Pt encouraged to try to exercise more regularly and try to get regular sleep when possible. Will have pt continue current dose of Vitamin D for now. Will recheck Vitamin D level prior to f/u with me in 3 months.  Will monitor.   - Vitamin D 25 Hydroxy; Future    4. Decreased libido  Pt reports having some decreased libido. Pt denies any depression or anxiety. Reports having good relationship with her . Pt's TFT's were normal 11/23/22. Pt made an appointment with her OBGYN-Dr. Dozier 3/15/23 for Well Woman/PAP and to discuss her decreased libido. Discussed with pt. Will have pt continue with plan to see her OBGYN-Dr. Dozier 3/15/23 as she will likely need a hormonal w/u to further evaluate decreased libido. Pt to f/u with me in 3 months. Will monitor. 5. Absent menses  6. Mother currently breast-feeding  Pt is breastfeeding her 9 month old. Pt reports that she has not yet gotten her menses back due breastfeeding. Urine pregnancy test negative in office 11/23/22. Discussed with pt. Menses will likely return as her 6month old's demand for feedings decreases over next several months. Pt to f/u with me in 3 months. Will monitor. 7. Encounter for immunization  Pt declined Covid, Flu, Tdap vax today. Will continue to encourage pt to get UTD on these at future appointments    8. Screening for cervical cancer  Pt UTD on PAP 6/8/20-negative. Pt made an appointment with her OBGYN-Dr. Ray Bates 3/15/23 for Well Woman/PAP    9. Acute maxillary sinusitis, recurrence not specified  Discussed with pt. Symptoms/physical exam findings c/w acute maxillary sinusitis. Will cover with abx. Checked allergies. Will cover with Augmentin 875 mg po bid with food for 10 days and treat other symptoms as below. Pt can f/u PRN for this. Agrees to call if symptoms worsen or fail to improve. - amoxicillin-clavulanate (AUGMENTIN) 875-125 MG per tablet; Take 1 tablet by mouth 2 times daily for 10 days  Dispense: 20 tablet; Refill: 0    10. Nasal congestion  11. Sinus pressure  12. Post-nasal drip  Will have pt take PRN OTC Mucinex and PRN OTC nasal saline as directed to help with above symptoms.      Return in about 3 months (around 3/22/2023) for To recheck Vitamin D/fatigue-have VItamin D drawn prior. Call sooner if sinus s/s fail to improve. SUBJECTIVE/OBJECTIVE:    HPI 22  Trevor Castro (: 1985) is a [address] y.o. female, New patient patient, here for evaluation of the following chief complaint(s):  New Patient (Pt here today to [facility] care. Pt denies having a recent PCP. Pt denies having any sig PMH and takes no medications. 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 Pt is breastfeeding the 9 month old. Pt reports that she has not yet gotten her menses back due breastfeeding. Pt reports that overall she feels well, but wishes to discuss some ongoing fatigue and low libido. )    HPI today-  Trevor Castro (: 1985) is a 30 y.o. female, Established patient patient, here for evaluation of the following chief complaint(s): Annual Exam (Pt here today for routine physical/lab review and to recheck ongoing fatigue/Vitamin D insufficiency, decreased libido, absent menses. Pt reports following previous POC as directed. Please see ROS/Assessment and Plan section for full details regarding these issues. Pt has one additional main concern today. ) and Sinusitis (Pt reports starting a week ago with nasal congestion with pale yellow d/c, bilat maxillary sinus pressure/pain, scratchy sore throat-PND. Pt denies any fever, chills, BA's, malaise, cough, SOB, wheezing, CP, N/V/D/abd pain associated. Pt denies having Covid or Flu vax. Pt denies taking anything OTC for the symptoms. Reports symptoms have worsened over past few days. )     No Known Allergies  [unfilled]  Past Medical History:   Diagnosis Date    Abnormal Papanicolaou smear of cervix     Decreased libido 2022    H/O seasonal allergies     Migraines     Subchorionic hemorrhage in first trimester 2021 at [facility]: Went to ED on 21 with bleeding. Seen today 0.8 cm x 3.9 cm x 5.3 cm, healing.  Pt no recent fresh Appearance: Normal appearance. She is normal weight. She is not ill-appearing, toxic-appearing or diaphoretic. HENT:      Head: Normocephalic and atraumatic. Right Ear: Tympanic membrane, ear canal and external ear normal. There is no impacted cerumen. Left Ear: Tympanic membrane, ear canal and external ear normal. There is no impacted cerumen. Nose: Congestion (turbs erythematous and edematous) and rhinorrhea (trapped pale yellow) present. Comments: Mild bilat maxillary sinus ttp     Mouth/Throat:      Mouth: Mucous membranes are moist.      Pharynx: Posterior oropharyngeal erythema (cobblestone appearance-PND. No tonsillary edema or exudates) present. No oropharyngeal exudate. Eyes:      General: No scleral icterus. Right eye: No discharge. Left eye: No discharge. Extraocular Movements: Extraocular movements intact. Conjunctiva/sclera: Conjunctivae normal.      Pupils: Pupils are equal, round, and reactive to light. Cardiovascular:      Rate and Rhythm: Normal rate and regular rhythm. Pulses: Normal pulses. Heart sounds: Normal heart sounds. No murmur heard. No friction rub. No gallop. Pulmonary:      Effort: Pulmonary effort is normal. No respiratory distress. Breath sounds: Normal breath sounds. No stridor. No wheezing, rhonchi or rales. Chest:      Chest wall: No tenderness. Abdominal:      General: Abdomen is flat. Bowel sounds are normal. There is no distension. Palpations: Abdomen is soft. There is no mass. Tenderness: There is no abdominal tenderness. There is no right CVA tenderness, left CVA tenderness, guarding or rebound. Hernia: No hernia is present. Musculoskeletal:         General: No swelling, tenderness, deformity or signs of injury. Normal range of motion. Cervical back: Normal range of motion and neck supple. No rigidity or tenderness. Right lower leg: No edema. Left lower leg: No edema. Comments: Gait steady and unassisted   Lymphadenopathy:      Cervical: No cervical adenopathy. Skin:     General: Skin is warm. Capillary Refill: Capillary refill takes less than 2 seconds. Coloration: Skin is not jaundiced or pale. Findings: No bruising, erythema, lesion or rash. Neurological:      General: No focal deficit present. Mental Status: She is alert and oriented to person, place, and time. Cranial Nerves: No cranial nerve deficit. Sensory: No sensory deficit. Motor: No weakness. Coordination: Coordination normal.      Gait: Gait normal.   Psychiatric:         Mood and Affect: Mood normal.         Behavior: Behavior normal.         Thought Content:  Thought content normal.         Judgment: Judgment normal.     Following labs reviewed with pt today  Component      Latest Ref Rng & Units 11/23/2022          11:16 AM   Valid Internal Control, POC       yes   HCG, Pregnancy, Urine, POC      Negative Negative     Component      Latest Ref Rng & Units 11/23/2022          11:03 AM   WBC      4.3 - 11.1 K/uL 5.6   RBC      4.05 - 5.2 M/uL 4.67   Hemoglobin Quant      11.7 - 15.4 g/dL 14.0   Hematocrit      35.8 - 46.3 % 41.9   MCV      82 - 102 FL 89.7   MCH      26.1 - 32.9 PG 30.0   MCHC      31.4 - 35.0 g/dL 33.4   RDW      11.9 - 14.6 % 12.4   Platelet Count      215 - 450 K/uL 209   MPV      9.4 - 12.3 FL 10.6   Nucleated Red Blood Cells      0.0 - 0.2 K/uL 0.00   Differential Type       AUTOMATED   Seg Neutrophils      43 - 78 % 48   Lymphocytes      13 - 44 % 44   Monocytes      4.0 - 12.0 % 5   Eosinophils %      0.5 - 7.8 % 2   Basophils      0.0 - 2.0 % 1   Immature Granulocytes      0.0 - 5.0 % 0   Segs Absolute      1.7 - 8.2 K/UL 2.7   Absolute Lymph #      0.5 - 4.6 K/UL 2.5   Absolute Mono #      0.1 - 1.3 K/UL 0.3   Absolute Eos #      0.0 - 0.8 K/UL 0.1   Basophils Absolute      0.0 - 0.2 K/UL 0.1   Absolute Immature Granulocyte      0.0 - 0.5 K/UL 0.0 Component      Latest Ref Rng & Units 11/23/2022          11:03 AM   Sodium      133 - 143 mmol/L 139   Potassium      3.5 - 5.1 mmol/L 4.1   Chloride      101 - 110 mmol/L 105   CO2      21 - 32 mmol/L 30   Anion Gap      2 - 11 mmol/L 4   Glucose, Random      65 - 100 mg/dL 84   BUN,BUNPL      6 - 23 MG/DL 14   Creatinine      0.6 - 1.0 MG/DL 0.70   Est, Glom Filt Rate      >60 ml/min/1.73m2 >60   CALCIUM, SERUM, 073911      8.3 - 10.4 MG/DL 9.3   Bilirubin      0.2 - 1.1 MG/DL 0.5   ALT      12 - 65 U/L 20   AST      15 - 37 U/L 13 (L)   Alk Phosphatase      50 - 136 U/L 73   Total Protein      6.3 - 8.2 g/dL 8.0   Albumin      3.5 - 5.0 g/dL 4.7   Globulin      2.8 - 4.5 g/dL 3.3   ALBUMIN/GLOBULIN RATIO      0.4 - 1.6   1.4     Component      Latest Ref Rng & Units 11/23/2022 11/23/2022          11:03 AM 11:03 AM   Vitamin B-12      193 - 986 pg/mL  416   Vit D, 25-Hydroxy      30.0 - 100.0 ng/mL 29.2 (L)      Component      Latest Ref Rng & Units 11/23/2022          11:03 AM   TSH w Free Thyroid if Abnormal      0.358 - 3.740 UIU/ML 1.41     Component      Latest Ref Rng & Units 11/23/2022          11:03 AM   CHOLESTEROL, TOTAL, [id number]      <200 MG/   Triglycerides      35 - 150 MG/DL 58   HDL Cholesterol      40 - 60 MG/DL 49   LDL Calculated      <100 MG/DL 94.4   VLDL Cholesterol Calculated      6.0 - 23.0 MG/DL 11.6   Chol/HDL Ratio       3.2   PHQ-9 Total Score: 0 (11/23/2022 10:22 AM)  Thoughts that you would be better off dead, or of hurting yourself in some way: 0 (11/23/2022 10:22 AM)  URBANO-7 SCREENING 11/23/2022   Feeling nervous, anxious, or on edge Several days   Not being able to stop or control worrying Several daysw   Worrying too much about different things Not at all   Trouble relaxing Not at all   Being so restless that it is hard to sit still Not at all   Becoming easily annoyed or irritable Not at all   Feeling afraid as if something awful might happen Several days   URBANO-7 Total Score 3   How difficult have these problems made it for you to do your work, take care of things at home, or get along with other people? Not difficult at all      PLEASE NOTE:  This document has been produced using voice recognition software. Unrecognized errors in transcription may be present. An electronic signature was used to authenticate this note.   -- NATALIE Barrera Ala, NP

## 2023-03-14 NOTE — PROGRESS NOTES
HPI:  Ms. Deysi Garcia is a 30 y.o.   OB History          3    Para   3    Term   3            AB        Living   3         SAB        IAB        Ectopic        Molar        Multiple        Live Births   3             who is here today for a well woman exam. She complains of fever yesterday (101 F per patient) and chills yesterday and breast tenderness left upper out quadrant that started Monday evening. Pt stated she had mastitis 2023 and took a course of antibiotics. Temp in office 80 F (oral)  Still breas feeding  Sister and Mom had cervical cancer- concerned about her risk   Date Performed Result   PAP 2020  10/16/2018 Neg, HPV Neg  ASCUS, HPV Neg   Mammogram NA    Colonoscopy NA    Dexa NA                  GYN History           No LMP recorded. Pt has not had a cycle since 2021. Pt currently breastfeeding. Past Medical History:  Past Medical History:   Diagnosis Date    Abnormal Papanicolaou smear of cervix     Decreased libido 2022    H/O seasonal allergies     Migraines     Subchorionic hemorrhage in first trimester 2021 at [facility]: Went to ED on 21 with bleeding. Seen today 0.8 cm x 3.9 cm x 5.3 cm, healing. Pt no recent fresh bleeding. 2021 [facility]: [address] resolved. Low lying placenta likely to resolve. Use of letrozole (Femara)        Past Surgical History:  Past Surgical History:   Procedure Laterality Date    COLPOSCOPY      LEEP      OTHER SURGICAL HISTORY      Jaw surgery       Allergies:   No Known Allergies    Medication History:  Current Outpatient Medications   Medication Sig Dispense Refill    vitamin D (CHOLECALCIFEROL) 50 MCG (2000) TABS tablet Take 1 tablet by mouth daily 30 tablet 2     No current facility-administered medications for this visit.        Social History:  Social History     Socioeconomic History    Marital status:      Spouse name: Not on file    Number of children: Not on file    Years of education: Not on file    Highest education level: Not on file   Occupational History    Not on file   Tobacco Use    Smoking status: Never    Smokeless tobacco: Never   Substance and Sexual Activity    Alcohol use: No    Drug use: No    Sexual activity: Not on file   Other Topics Concern    Not on file   Social History Narrative    Not on file     Social Determinants of Health     Financial Resource Strain: Not on file   Food Insecurity: Not on file   Transportation Needs: Not on file   Physical Activity: Not on file   Stress: Not on file   Social Connections: Not on file   Intimate Partner Violence: Not on file   Housing Stability: Not on file       Family History:  Family History   Problem Relation Age of Onset    Pancreatic Cancer Paternal Grandmother         Pancreatic    No Known Problems Maternal Grandfather     Heart Attack Father     No Known Problems Mother     Diabetes Maternal Grandmother     No Known Problems Paternal Grandfather        Review of Systems - General ROS: negative except for that discussed in HPI      ROS:  Feeling well. No dyspnea or chest pain on exertion. No abdominal pain, change in bowel habits, black or bloody stools. No urinary tract symptoms. No neurological complaints.     Objective:   /72   Temp 98 °F (36.7 °C) (Oral)   Ht 5' 6\" (1.676 m)   Wt 132 lb 3.2 oz (60 kg)   BMI 21.34 kg/m²     Results for orders placed or performed in visit on 03/15/23   AMB POC URINALYSIS DIP STICK MANUAL W/O MICRO   Result Value Ref Range    Color (UA POC) Yellow     Clarity (UA POC) Clear     Glucose, Urine, POC Negative Negative    Bilirubin, Urine, POC Negative Negative    Ketones, Urine, POC Negative Negative    Specific Gravity, Urine, POC 1.020 1.001 - 1.035    Blood (UA POC) Negative Negative    pH, Urine, POC 5.5 4.6 - 8.0    Protein, Urine, POC Negative Negative    Urobilinogen, POC 0.2 mg/dL     Nitrite, Urine, POC Negative Negative    Leukocyte Esterase, Urine, POC Small Negative        The patient appears well, alert, oriented x 3, in no distress. ENT normal.  Neck supple. No adenopathy or thyromegaly. Lungs:  clear, good air entry, no wheezes, rhonchi or rales. Heart:  S1 and S2 normal, no murmurs, regular rate and rhythm. Abdomen:  soft without tenderness, guarding, mass or organomegaly. Extremities show no edema, normal peripheral pulses. Neurological is normal, no focal findings. BREAST EXAM: breasts appear normal, no suspicious masses, no skin or nipple changes or axillary nodes, symmetric fibrous changes bilaterally, abnormal mass palpable on left large areaof blocked duct 5 cm in upper quadrant of left breast very tender to touch no redness or erythema- cc with blocked duct  PT to massage and start lecithin    PELVIC EXAM: External genitalia is within normal limits, urethra, urethra meatus and bladder are midline well supported. Vagina is well rugated, Cervix comes into full view and is within normal limits. Uterus is 6, mid week size, no ovarian masses palpated    Assessment/Plan:      Diagnosis Orders   1. Well woman exam        2. Screening for genitourinary condition  AMB POC URINALYSIS DIP STICK MANUAL W/O MICRO      3. Screening for human papillomavirus (HPV)        4. Screening for cervical cancer          Encounter Diagnoses   Name Primary?     Well woman exam Yes    Screening for genitourinary condition     Screening for human papillomavirus (HPV)     Screening for cervical cancer      Orders Placed This Encounter   Procedures    AMB POC URINALYSIS DIP STICK MANUAL W/O MICRO       pap smear  return annually or prn  Lecithin and massage for blocked duct pt to call if feels like turning into mastitis    Kailee Vasquez RN

## 2023-03-15 ENCOUNTER — OFFICE VISIT (OUTPATIENT)
Dept: OBGYN CLINIC | Age: 38
End: 2023-03-15
Payer: COMMERCIAL

## 2023-03-15 VITALS
HEIGHT: 66 IN | DIASTOLIC BLOOD PRESSURE: 72 MMHG | WEIGHT: 132.2 LBS | BODY MASS INDEX: 21.24 KG/M2 | TEMPERATURE: 98 F | SYSTOLIC BLOOD PRESSURE: 114 MMHG

## 2023-03-15 DIAGNOSIS — Z01.419 WELL WOMAN EXAM: Primary | ICD-10-CM

## 2023-03-15 DIAGNOSIS — Z12.4 SCREENING FOR CERVICAL CANCER: ICD-10-CM

## 2023-03-15 DIAGNOSIS — Z13.89 SCREENING FOR GENITOURINARY CONDITION: ICD-10-CM

## 2023-03-15 DIAGNOSIS — Z11.51 SCREENING FOR HUMAN PAPILLOMAVIRUS (HPV): ICD-10-CM

## 2023-03-15 LAB
BILIRUBIN, URINE, POC: NEGATIVE
BLOOD URINE, POC: NEGATIVE
GLUCOSE URINE, POC: NEGATIVE
KETONES, URINE, POC: NEGATIVE
LEUKOCYTE ESTERASE, URINE, POC: NORMAL
NITRITE, URINE, POC: NEGATIVE
PH, URINE, POC: 5.5 (ref 4.6–8)
PROTEIN,URINE, POC: NEGATIVE
SPECIFIC GRAVITY, URINE, POC: 1.02 (ref 1–1.03)
URINALYSIS CLARITY, POC: CLEAR
URINALYSIS COLOR, POC: YELLOW
UROBILINOGEN, POC: NORMAL

## 2023-03-15 PROCEDURE — 99395 PREV VISIT EST AGE 18-39: CPT | Performed by: OBSTETRICS & GYNECOLOGY

## 2023-03-15 PROCEDURE — 81002 URINALYSIS NONAUTO W/O SCOPE: CPT | Performed by: OBSTETRICS & GYNECOLOGY

## 2023-03-21 LAB
CYTOLOGIST CVX/VAG CYTO: NORMAL
CYTOLOGY CVX/VAG DOC THIN PREP: NORMAL
HPV APTIMA: NEGATIVE
HPV GENOTYPE REFLEX: NORMAL
Lab: NORMAL
Lab: NORMAL
PATH REPORT.FINAL DX SPEC: NORMAL
STAT OF ADQ CVX/VAG CYTO-IMP: NORMAL

## 2023-05-23 ENCOUNTER — TELEMEDICINE (OUTPATIENT)
Dept: FAMILY MEDICINE CLINIC | Facility: CLINIC | Age: 38
End: 2023-05-23
Payer: COMMERCIAL

## 2023-05-23 DIAGNOSIS — Z12.4 SCREENING FOR CERVICAL CANCER: ICD-10-CM

## 2023-05-23 DIAGNOSIS — R09.82 POST-NASAL DRIP: ICD-10-CM

## 2023-05-23 DIAGNOSIS — R50.9 FEVER, UNSPECIFIED FEVER CAUSE: ICD-10-CM

## 2023-05-23 DIAGNOSIS — J01.00 ACUTE MAXILLARY SINUSITIS, RECURRENCE NOT SPECIFIED: Primary | ICD-10-CM

## 2023-05-23 DIAGNOSIS — R09.81 NASAL CONGESTION: ICD-10-CM

## 2023-05-23 DIAGNOSIS — J34.89 SINUS PRESSURE: ICD-10-CM

## 2023-05-23 DIAGNOSIS — E55.9 VITAMIN D INSUFFICIENCY: ICD-10-CM

## 2023-05-23 DIAGNOSIS — Z23 ENCOUNTER FOR IMMUNIZATION: ICD-10-CM

## 2023-05-23 DIAGNOSIS — R53.83 FATIGUE, UNSPECIFIED TYPE: ICD-10-CM

## 2023-05-23 PROBLEM — N91.2 ABSENT MENSES: Status: RESOLVED | Noted: 2022-11-23 | Resolved: 2023-05-23

## 2023-05-23 PROBLEM — R68.82 DECREASED LIBIDO: Status: RESOLVED | Noted: 2022-11-23 | Resolved: 2023-05-23

## 2023-05-23 PROCEDURE — 99213 OFFICE O/P EST LOW 20 MIN: CPT | Performed by: NURSE PRACTITIONER

## 2023-05-23 RX ORDER — AMOXICILLIN AND CLAVULANATE POTASSIUM 875; 125 MG/1; MG/1
1 TABLET, FILM COATED ORAL 2 TIMES DAILY
Qty: 20 TABLET | Refills: 0 | Status: SHIPPED | OUTPATIENT
Start: 2023-05-23 | End: 2023-06-02

## 2023-05-23 SDOH — ECONOMIC STABILITY: HOUSING INSECURITY
IN THE LAST 12 MONTHS, WAS THERE A TIME WHEN YOU DID NOT HAVE A STEADY PLACE TO SLEEP OR SLEPT IN A SHELTER (INCLUDING NOW)?: NO

## 2023-05-23 SDOH — ECONOMIC STABILITY: INCOME INSECURITY: HOW HARD IS IT FOR YOU TO PAY FOR THE VERY BASICS LIKE FOOD, HOUSING, MEDICAL CARE, AND HEATING?: NOT HARD AT ALL

## 2023-05-23 SDOH — ECONOMIC STABILITY: FOOD INSECURITY: WITHIN THE PAST 12 MONTHS, YOU WORRIED THAT YOUR FOOD WOULD RUN OUT BEFORE YOU GOT MONEY TO BUY MORE.: NEVER TRUE

## 2023-05-23 SDOH — ECONOMIC STABILITY: FOOD INSECURITY: WITHIN THE PAST 12 MONTHS, THE FOOD YOU BOUGHT JUST DIDN'T LAST AND YOU DIDN'T HAVE MONEY TO GET MORE.: NEVER TRUE

## 2023-05-23 ASSESSMENT — ENCOUNTER SYMPTOMS
GASTROINTESTINAL NEGATIVE: 1
COUGH: 0
RESPIRATORY NEGATIVE: 1
RECTAL PAIN: 0
CONSTIPATION: 0
ABDOMINAL PAIN: 0
EYE PAIN: 0
SORE THROAT: 0
EYE DISCHARGE: 0
BLOOD IN STOOL: 0
STRIDOR: 0
NAUSEA: 0
CHOKING: 0
TROUBLE SWALLOWING: 0
RHINORRHEA: 1
WHEEZING: 0
COLOR CHANGE: 0
SINUS PRESSURE: 1
DIARRHEA: 0
APNEA: 0
ALLERGIC/IMMUNOLOGIC NEGATIVE: 1
ABDOMINAL DISTENTION: 0
VOMITING: 0
SINUS PAIN: 1
CHEST TIGHTNESS: 0
EYES NEGATIVE: 1
BACK PAIN: 0
SHORTNESS OF BREATH: 0
VOICE CHANGE: 0
ANAL BLEEDING: 0
FACIAL SWELLING: 0

## 2023-05-23 ASSESSMENT — PATIENT HEALTH QUESTIONNAIRE - PHQ9
SUM OF ALL RESPONSES TO PHQ QUESTIONS 1-9: 0
SUM OF ALL RESPONSES TO PHQ QUESTIONS 1-9: 0
1. LITTLE INTEREST OR PLEASURE IN DOING THINGS: 0
SUM OF ALL RESPONSES TO PHQ QUESTIONS 1-9: 0
2. FEELING DOWN, DEPRESSED OR HOPELESS: 0
SUM OF ALL RESPONSES TO PHQ QUESTIONS 1-9: 0
SUM OF ALL RESPONSES TO PHQ9 QUESTIONS 1 & 2: 0

## 2023-05-23 NOTE — PROGRESS NOTES
directed. Please see ROS/Assessment and Plan section for full details regarding these issues. Pt has one additional main concern today. ) and Sinusitis (Pt reports starting a week ago with nasal congestion with pale yellow d/c, bilat maxillary sinus pressure/pain, scratchy sore throat-PND. Pt denies any fever, chills, BA's, malaise, cough, SOB, wheezing, CP, N/V/D/abd pain associated. Pt denies having Covid or Flu vax. Pt denies taking anything OTC for the symptoms. Reports symptoms have worsened over past few days. )       HPI VV today-  Toi Egan (: 1985) is a 30 y.o. female, Established patient patient, seen via A/V VV for evaluation of the following chief complaint(s):  Sinusitis (Pt reports starting yesterday with fever, nasal congestion with yellow d/c, bilat maxillary sinus pressure/pain, scratchy sore throat-PND. Pt denies any cough, SOB, wheezing, CP, N/V/D/abd pain associated. Pt is not taking any OTC for the symptoms. Pt is not a smoker. Pt reports having negative at home Covid test. Pt is not UTD on Covid vaccine. Pt is breastfeeding. LMP-23 per pt. /)      We discussed the expected course, resolution and complications of the diagnosis(es) in detail. Medication risks, benefits, costs, interactions, and alternatives were discussed as indicated. I advised her to contact the office if her condition worsens, changes or fails to improve as anticipated. She expressed understanding with the diagnosis(es) and plan. Toi Egan, was evaluated through a synchronous (real-time) audio-video encounter. The patient (or guardian if applicable) is aware that this is a billable service. Verbal consent to proceed has been obtained within the past 12 months. The visit was conducted pursuant to the emergency declaration under the [facility],  waiver authority and the [employer].   Patient identification

## 2023-09-13 ENCOUNTER — TELEMEDICINE (OUTPATIENT)
Dept: FAMILY MEDICINE CLINIC | Facility: CLINIC | Age: 38
End: 2023-09-13
Payer: COMMERCIAL

## 2023-09-13 ENCOUNTER — HOSPITAL ENCOUNTER (OUTPATIENT)
Dept: GENERAL RADIOLOGY | Age: 38
Discharge: HOME | End: 2023-09-16

## 2023-09-13 DIAGNOSIS — M79.672 LEFT FOOT PAIN: Primary | ICD-10-CM

## 2023-09-13 DIAGNOSIS — M79.672 LEFT FOOT PAIN: ICD-10-CM

## 2023-09-13 DIAGNOSIS — R53.83 FATIGUE, UNSPECIFIED TYPE: ICD-10-CM

## 2023-09-13 DIAGNOSIS — E55.9 VITAMIN D INSUFFICIENCY: ICD-10-CM

## 2023-09-13 PROCEDURE — 99213 OFFICE O/P EST LOW 20 MIN: CPT | Performed by: NURSE PRACTITIONER

## 2023-09-13 ASSESSMENT — ENCOUNTER SYMPTOMS
EYE PAIN: 0
DIARRHEA: 0
ALLERGIC/IMMUNOLOGIC NEGATIVE: 1
APNEA: 0
SINUS PAIN: 0
BLOOD IN STOOL: 0
CHOKING: 0
COUGH: 0
CONSTIPATION: 0
GASTROINTESTINAL NEGATIVE: 1
SHORTNESS OF BREATH: 0
VOICE CHANGE: 0
BACK PAIN: 0
VOMITING: 0
WHEEZING: 0
NAUSEA: 0
EYES NEGATIVE: 1
EYE DISCHARGE: 0
STRIDOR: 0
TROUBLE SWALLOWING: 0
ABDOMINAL DISTENTION: 0
RHINORRHEA: 0
COLOR CHANGE: 0
FACIAL SWELLING: 0
SINUS PRESSURE: 0
ANAL BLEEDING: 0
SORE THROAT: 0
RESPIRATORY NEGATIVE: 1
ABDOMINAL PAIN: 0
CHEST TIGHTNESS: 0
RECTAL PAIN: 0

## 2023-09-13 NOTE — PROGRESS NOTES
(and/or legal guardian's) consent. Patient identification was verified, and a caregiver was present when appropriate. The patient was located at Other: riding in car in [facility]  Provider was located at [facility] ([address]): [address] [place] [address] [place] [phone]  {STOP! Confirm you are appropriately licensed, registered, or certified to deliver care in the state where the patient is located as indicated above. If you are not or unsure, please re-schedule the visit. I am licensed in the state [facility] where the pt is located.      NATALIE Quinonez NP

## 2023-09-19 ENCOUNTER — OFFICE VISIT (OUTPATIENT)
Dept: FAMILY MEDICINE CLINIC | Facility: CLINIC | Age: 38
End: 2023-09-19
Payer: COMMERCIAL

## 2023-09-19 VITALS — OXYGEN SATURATION: 99 % | SYSTOLIC BLOOD PRESSURE: 116 MMHG | HEART RATE: 96 BPM | DIASTOLIC BLOOD PRESSURE: 73 MMHG

## 2023-09-19 DIAGNOSIS — Z12.4 SCREENING FOR CERVICAL CANCER: ICD-10-CM

## 2023-09-19 DIAGNOSIS — M19.072 PRIMARY OSTEOARTHRITIS OF LEFT FOOT: Primary | ICD-10-CM

## 2023-09-19 DIAGNOSIS — Z23 ENCOUNTER FOR IMMUNIZATION: ICD-10-CM

## 2023-09-19 DIAGNOSIS — R53.83 FATIGUE, UNSPECIFIED TYPE: ICD-10-CM

## 2023-09-19 DIAGNOSIS — E55.9 VITAMIN D INSUFFICIENCY: ICD-10-CM

## 2023-09-19 DIAGNOSIS — Z00.00 LABORATORY EXAM ORDERED AS PART OF ROUTINE GENERAL MEDICAL EXAMINATION: ICD-10-CM

## 2023-09-19 DIAGNOSIS — M79.672 LEFT FOOT PAIN: ICD-10-CM

## 2023-09-19 LAB — 25(OH)D3 SERPL-MCNC: 31.4 NG/ML (ref 30–100)

## 2023-09-19 PROCEDURE — 99213 OFFICE O/P EST LOW 20 MIN: CPT | Performed by: NURSE PRACTITIONER

## 2023-09-19 ASSESSMENT — ENCOUNTER SYMPTOMS
EYES NEGATIVE: 1
RESPIRATORY NEGATIVE: 1
BACK PAIN: 0
APNEA: 0
COUGH: 0
VOMITING: 0
VOICE CHANGE: 0
CHEST TIGHTNESS: 0
ANAL BLEEDING: 0
GASTROINTESTINAL NEGATIVE: 1
SINUS PRESSURE: 0
SHORTNESS OF BREATH: 0
ABDOMINAL DISTENTION: 0
FACIAL SWELLING: 0
SORE THROAT: 0
BLOOD IN STOOL: 0
CONSTIPATION: 0
WHEEZING: 0
RECTAL PAIN: 0
ABDOMINAL PAIN: 0
SINUS PAIN: 0
EYE PAIN: 0
TROUBLE SWALLOWING: 0
NAUSEA: 0
EYE DISCHARGE: 0
DIARRHEA: 0
ALLERGIC/IMMUNOLOGIC NEGATIVE: 1
COLOR CHANGE: 0
STRIDOR: 0
CHOKING: 0
RHINORRHEA: 0

## 2023-09-19 NOTE — PROGRESS NOTES
[address] [address], [address]  Phone: [phone] Fax [phone]  Zofia Hart Juan MS, APRN, FNP-C  9/19/2023  Chief Complaint   Patient presents with    Follow-up     To recheck left foot pain-see below      Pt reports hitting left foot against the stairs over a year ago. Pt reports since then she has had ongoing intermittent pain that extends from left great toe down to left mid foot. Pt reports pain is worse with weight bearing. Pt denies any edema, erythema, increased warmth, or deformity to left foot. Pt reports being able to ambulate unassisted but with pain and feels at times that she is ambulating on the side of her left foot. Pt reports taking PRN OTC Ibuprofen with some relief. On 9/13/23, pt's left foot x-ray did not reveal a fracture or dislocation. Did reveal some osteoarthritis to joint space between left great toe and metatarsal bone in left foot. LMP 9/2/23 per pt. ASSESSMENT/PLAN:  Below is the assessment and plan developed based on review of pertinent history, physical exam, labs, studies, and medications. 1. Primary osteoarthritis of left foot  2. Left foot pain  Pt reports hitting left foot against the stairs over a year ago. Pt reports since then she has had ongoing intermittent pain that extends from left great toe down to left mid foot. Pt reports pain is worse with weight bearing. Pt denies any edema, erythema, increased warmth, or deformity to left foot. Pt reports being able to ambulate unassisted but with pain and feels at times that she is ambulating on the side of her left foot. Pt reports taking PRN OTC Ibuprofen with some relief. On 9/13/23, pt's left foot x-ray did not reveal a fracture or dislocation. Did reveal some osteoarthritis to joint space between left great toe and metatarsal bone in left foot. Discussed with pt. PRN OTC Ibuprofen did help some but feel that PRN Voltaren 1% gel likely will help more. Rx given.  Will have

## 2023-09-20 DIAGNOSIS — R53.83 FATIGUE, UNSPECIFIED TYPE: ICD-10-CM

## 2023-09-20 DIAGNOSIS — E55.9 VITAMIN D INSUFFICIENCY: Primary | ICD-10-CM

## 2023-09-20 RX ORDER — MULTIVIT-MIN/IRON/FOLIC ACID/K 18-600-40
2 CAPSULE ORAL DAILY
Qty: 60 CAPSULE | Refills: 5 | Status: SHIPPED | OUTPATIENT
Start: 2023-09-20

## 2023-10-06 ENCOUNTER — TELEMEDICINE (OUTPATIENT)
Dept: FAMILY MEDICINE CLINIC | Facility: CLINIC | Age: 38
End: 2023-10-06
Payer: COMMERCIAL

## 2023-10-06 DIAGNOSIS — M19.072 PRIMARY OSTEOARTHRITIS OF LEFT FOOT: ICD-10-CM

## 2023-10-06 DIAGNOSIS — R09.82 POST-NASAL DRIP: ICD-10-CM

## 2023-10-06 DIAGNOSIS — Z12.4 SCREENING FOR CERVICAL CANCER: ICD-10-CM

## 2023-10-06 DIAGNOSIS — Z23 ENCOUNTER FOR IMMUNIZATION: ICD-10-CM

## 2023-10-06 DIAGNOSIS — R53.83 FATIGUE, UNSPECIFIED TYPE: ICD-10-CM

## 2023-10-06 DIAGNOSIS — R09.81 NASAL CONGESTION: ICD-10-CM

## 2023-10-06 DIAGNOSIS — J40 BRONCHITIS: Primary | ICD-10-CM

## 2023-10-06 DIAGNOSIS — E55.9 VITAMIN D INSUFFICIENCY: ICD-10-CM

## 2023-10-06 DIAGNOSIS — J98.01 BRONCHOSPASM: ICD-10-CM

## 2023-10-06 DIAGNOSIS — R05.3 PERSISTENT DRY COUGH: ICD-10-CM

## 2023-10-06 PROCEDURE — 99213 OFFICE O/P EST LOW 20 MIN: CPT | Performed by: NURSE PRACTITIONER

## 2023-10-06 RX ORDER — DOXYCYCLINE HYCLATE 100 MG/1
100 CAPSULE ORAL 2 TIMES DAILY
Qty: 14 CAPSULE | Refills: 0 | Status: SHIPPED | OUTPATIENT
Start: 2023-10-06 | End: 2023-10-13

## 2023-10-06 RX ORDER — BENZONATATE 100 MG/1
100 CAPSULE ORAL 3 TIMES DAILY PRN
Qty: 21 CAPSULE | Refills: 0 | Status: SHIPPED | OUTPATIENT
Start: 2023-10-06 | End: 2023-10-13

## 2023-10-06 RX ORDER — PREDNISONE 20 MG/1
TABLET ORAL
Qty: 11 TABLET | Refills: 0 | Status: SHIPPED | OUTPATIENT
Start: 2023-10-06

## 2023-10-06 RX ORDER — ALBUTEROL SULFATE 90 UG/1
2 AEROSOL, METERED RESPIRATORY (INHALATION) EVERY 6 HOURS PRN
Qty: 1 EACH | Refills: 0 | Status: SHIPPED | OUTPATIENT
Start: 2023-10-06

## 2023-10-06 ASSESSMENT — ENCOUNTER SYMPTOMS
GASTROINTESTINAL NEGATIVE: 1
FACIAL SWELLING: 0
DIARRHEA: 0
COLOR CHANGE: 0
RHINORRHEA: 1
ABDOMINAL DISTENTION: 0
ALLERGIC/IMMUNOLOGIC NEGATIVE: 1
COUGH: 1
STRIDOR: 0
VOMITING: 0
RECTAL PAIN: 0
BACK PAIN: 0
WHEEZING: 1
ABDOMINAL PAIN: 0
SINUS PAIN: 0
CONSTIPATION: 0
APNEA: 0
SHORTNESS OF BREATH: 1
VOICE CHANGE: 0
EYE DISCHARGE: 0
NAUSEA: 0
SORE THROAT: 1
SINUS PRESSURE: 0
CHEST TIGHTNESS: 0
BLOOD IN STOOL: 0
CHOKING: 0
EYES NEGATIVE: 1
EYE PAIN: 0
TROUBLE SWALLOWING: 0
ANAL BLEEDING: 0

## 2023-10-06 NOTE — PROGRESS NOTES
[address] [address], [address]  Phone: [phone] Fax [phone]  Rozina Gonzalez. Juan MS, APRN, FNP-C  10/6/2023    Tyler Lesch is a 30 y.o. female who was seen by synchronous (real-time) audio-video technology on 10/6/2023 for Cough (See below)    Pt reports starting 9/25/23 with nasal congestion with clear rhinorrhea, scratchy sore throat-PND, and dry cough. Pt reports symptoms have persisted and past few days pt has developed some mild SOB/wheezing (bronchospasm) with cough. Pt is not a smoker and denies having hx of asthma. Pt denies any associated fever, chills, body aches, sinus pressure or pain, sputum production, CP, N/V/D/abd pain associated. Pt denies taking a Covid test but at this point do not see the need to test as pt would have long cleared quarantine. Pt is not UTD on Covid vaccine. LMP 10/2/23 per pt. Please see ROS/Assessment and Plan section for full details of all other items discussed today. Assessment & Plan:     1. Bronchitis  Discussed with pt. Symptoms c/w acute bronchitis. With symptoms being present since 9/25/23, will cover with abx. Checked allergies. Will cover with Doxy 100 mg po bid for 7 days and treat other symptoms as below. Pt can f/u PRN for bronchitis-agrees to call in a few days if no better or sooner for concerns/new or worsening symptoms. Pt agrees to go to ER for severe symptoms as discussed. Otherwise keep f/u with me 11/16/23 as scheduled. - doxycycline hyclate (VIBRAMYCIN) 100 MG capsule; Take 1 capsule by mouth 2 times daily for 7 days  Dispense: 14 capsule; Refill: 0    2. Nasal congestion  3. Post-nasal drip  Will have pt take 14 days of OTC Zyrtec and give Prednisone 20 mg taper as directed to help with above symptoms. - predniSONE (DELTASONE) 20 MG tablet; 1 tab po bid with food for four days then 1 tab po daily with food for three days  Dispense: 11 tablet; Refill: 0    4.  Persistent dry cough  Will give

## 2023-10-11 ENCOUNTER — OFFICE VISIT (OUTPATIENT)
Dept: FAMILY MEDICINE CLINIC | Facility: CLINIC | Age: 38
End: 2023-10-11
Payer: COMMERCIAL

## 2023-10-11 ENCOUNTER — HOSPITAL ENCOUNTER (OUTPATIENT)
Dept: GENERAL RADIOLOGY | Age: 38
Discharge: HOME | End: 2023-10-14

## 2023-10-11 VITALS
WEIGHT: 139.2 LBS | OXYGEN SATURATION: 97 % | RESPIRATION RATE: 18 BRPM | SYSTOLIC BLOOD PRESSURE: 108 MMHG | BODY MASS INDEX: 22.37 KG/M2 | HEIGHT: 66 IN | DIASTOLIC BLOOD PRESSURE: 84 MMHG | HEART RATE: 88 BPM

## 2023-10-11 DIAGNOSIS — R05.3 PERSISTENT DRY COUGH: ICD-10-CM

## 2023-10-11 DIAGNOSIS — R53.83 FATIGUE, UNSPECIFIED TYPE: ICD-10-CM

## 2023-10-11 DIAGNOSIS — Z12.4 SCREENING FOR CERVICAL CANCER: ICD-10-CM

## 2023-10-11 DIAGNOSIS — B35.4 RINGWORM OF BODY: ICD-10-CM

## 2023-10-11 DIAGNOSIS — Z23 ENCOUNTER FOR IMMUNIZATION: ICD-10-CM

## 2023-10-11 DIAGNOSIS — R09.82 POST-NASAL DRIP: ICD-10-CM

## 2023-10-11 DIAGNOSIS — R09.81 NASAL CONGESTION: ICD-10-CM

## 2023-10-11 DIAGNOSIS — J40 BRONCHITIS: ICD-10-CM

## 2023-10-11 DIAGNOSIS — J40 BRONCHITIS: Primary | ICD-10-CM

## 2023-10-11 DIAGNOSIS — J98.01 BRONCHOSPASM: ICD-10-CM

## 2023-10-11 DIAGNOSIS — M19.072 PRIMARY OSTEOARTHRITIS OF LEFT FOOT: ICD-10-CM

## 2023-10-11 DIAGNOSIS — E55.9 VITAMIN D INSUFFICIENCY: ICD-10-CM

## 2023-10-11 PROCEDURE — 99214 OFFICE O/P EST MOD 30 MIN: CPT | Performed by: NURSE PRACTITIONER

## 2023-10-11 PROCEDURE — 96372 THER/PROPH/DIAG INJ SC/IM: CPT | Performed by: NURSE PRACTITIONER

## 2023-10-11 RX ORDER — CEFDINIR 300 MG/1
300 CAPSULE ORAL 2 TIMES DAILY
Qty: 14 CAPSULE | Refills: 0 | Status: SHIPPED | OUTPATIENT
Start: 2023-10-11 | End: 2023-10-18

## 2023-10-11 RX ORDER — TRIAMCINOLONE ACETONIDE 40 MG/ML
40 INJECTION, SUSPENSION INTRA-ARTICULAR; INTRAMUSCULAR ONCE
Status: COMPLETED | OUTPATIENT
Start: 2023-10-11 | End: 2023-10-11

## 2023-10-11 RX ORDER — DEXTROMETHORPHAN POLISTIREX 30 MG/5ML
60 SUSPENSION ORAL 2 TIMES DAILY PRN
Qty: 200 ML | Refills: 0 | Status: SHIPPED | OUTPATIENT
Start: 2023-10-11 | End: 2023-10-21

## 2023-10-11 RX ORDER — CLOTRIMAZOLE AND BETAMETHASONE DIPROPIONATE 10; .64 MG/G; MG/G
CREAM TOPICAL
Qty: 45 G | Refills: 0 | Status: SHIPPED | OUTPATIENT
Start: 2023-10-11

## 2023-10-11 RX ADMIN — TRIAMCINOLONE ACETONIDE 40 MG: 40 INJECTION, SUSPENSION INTRA-ARTICULAR; INTRAMUSCULAR at 15:57

## 2023-10-11 ASSESSMENT — ENCOUNTER SYMPTOMS
EYE DISCHARGE: 0
SINUS PRESSURE: 0
FACIAL SWELLING: 0
DIARRHEA: 0
ABDOMINAL DISTENTION: 0
TROUBLE SWALLOWING: 0
WHEEZING: 1
RHINORRHEA: 1
SHORTNESS OF BREATH: 1
STRIDOR: 0
GASTROINTESTINAL NEGATIVE: 1
SORE THROAT: 1
BACK PAIN: 0
VOICE CHANGE: 0
EYES NEGATIVE: 1
RECTAL PAIN: 0
BLOOD IN STOOL: 0
EYE PAIN: 0
APNEA: 0
VOMITING: 0
COUGH: 1
COLOR CHANGE: 0
CHEST TIGHTNESS: 0
CHOKING: 0
ALLERGIC/IMMUNOLOGIC NEGATIVE: 1
ABDOMINAL PAIN: 0
CONSTIPATION: 0
ANAL BLEEDING: 0
NAUSEA: 0
SINUS PAIN: 0

## 2023-10-11 NOTE — PROGRESS NOTES
[address] [address], [address]  Phone: [phone] Fax [phone]  Clark Martinez MS, APRN, FNP-C  10/11/2023  Chief Complaint   Patient presents with    Cough     See below      I saw pt for a VV 10/6/23 and treated pt for bronchitis (symptoms started 9/25/23). Pt reports taking her Doxy, Prednisone 20 mg taper, OTC Zyrtec, PRN Tessalon, and PRN Albuterol MDI as directed. Pt reports that she has not seen any improvement. Pt continues to report having nasal congestion with clear rhinorrhea, scratchy sore throat-PND, dry cough from PND, and mild SOB/wheezing (bronchospasm) with cough-PRN Albuterol MDI does help. Pt is not a smoker and denies having hx of asthma. Pt continues to deny any associated fever, chills, body aches, sinus pressure or pain, sputum production, CP, N/V/D/abd pain associated. Pt reports taking an at home Covid test 10/7/23 which was negative per pt. LMP 10/2/23 per pt. Please see ROS/Assessment and Plan section for details of all other items addressed during today's appointment. ASSESSMENT/PLAN:  Below is the assessment and plan developed based on review of pertinent history, physical exam, labs, studies, and medications. 1. Bronchitis  I saw pt for a VV 10/6/23 and treated pt for bronchitis (symptoms started 9/25/23). Pt reports taking her Doxy, Prednisone 20 mg taper, OTC Zyrtec, PRN Tessalon, and PRN Albuterol MDI as directed. Pt reports that she has not seen any improvement. Pt continues to report having nasal congestion with clear rhinorrhea, scratchy sore throat-PND, dry cough from PND, and mild SOB/wheezing (bronchospasm) with cough-PRN Albuterol MDI does help. Pt is not a smoker and denies having hx of asthma. Pt continues to deny any associated fever, chills, body aches, sinus pressure or pain, sputum production, CP, N/V/D/abd pain associated. Pt reports taking an at home Covid test 10/7/23 which was negative per pt.  LMP 10/2/23

## 2023-11-01 ENCOUNTER — TELEPHONE (OUTPATIENT)
Dept: FAMILY MEDICINE CLINIC | Facility: CLINIC | Age: 38
End: 2023-11-01

## 2023-11-01 DIAGNOSIS — B35.4 RINGWORM OF BODY: ICD-10-CM

## 2023-11-01 RX ORDER — CLOTRIMAZOLE AND BETAMETHASONE DIPROPIONATE 10; .64 MG/G; MG/G
CREAM TOPICAL
Qty: 45 G | Refills: 0 | Status: SHIPPED | OUTPATIENT
Start: 2023-11-01

## 2023-11-01 NOTE — TELEPHONE ENCOUNTER
Patient stated that her ring worm went away and now its back.  Requesting medication to be called in to her pharmacy

## 2023-11-15 ENCOUNTER — TELEPHONE (OUTPATIENT)
Dept: OBGYN CLINIC | Age: 38
End: 2023-11-15

## 2023-11-15 NOTE — TELEPHONE ENCOUNTER
Patient called with postcoital bleeding - per [address] with visit. 02-Mar-2017 20:58

## 2023-11-16 NOTE — PROGRESS NOTES
The patient is a 30 y.o. [id number] who is seen for Ultrasound secondary to postcoital bleeding. Pt reports started period like normal on 10/31 lasting 4 days. 1 wk later she was intimate with  and started spotting 11/11/23 and progressed and increased in flow over the next few days. Up until yesterday required a pad, changed about 2 times a day. States bleeding has tapered and lightened significantly today. Reports monthly and regular periods lasting 4-5 days. No prior hx  AUB, midcycle bleeding or irreg menses. Admits to having some long cycle interval up to 42d quite a while ago. She did require femara to become pregnant with first pregnancy    US findings from today:  GYN U/S SECONDARY TO PCB   CX APPEARS WNL   UTERUS ANTEVERTED AND HETEROGENOUS.   ENDO= 6.7 MM, POSSIBLE POLYP VISUALIZED YAMILKA MEASURING 0.6 X 0.3 X 0.5 CM. ROV VISUALIZED WITH MULTIPLE FOLLICLES AND INCREASED VOLUME. LOV VISUALIZED WITH MULTIPLE FOLLICLES AND ENLARGED WITH 2 SIMPLE CYSTS. #1= 2.0 X 2.1 X 2.0 CM   #2 IRREGULAR IN SHAPE= 2.3 X 1.3 X 1.8 CM. NO ADN MASSES OR FREE FLUID VISUALIZED. HISTORY:    [id number]  Patient's last menstrual period was 10/31/2023 (exact date).   Sexual History:  single partner, contraception - condoms  Contraception:  condoms  Current Outpatient Medications on File Prior to Visit   Medication Sig Dispense Refill    clotrimazole-betamethasone (LOTRISONE) 1-0.05 % cream Apply topically 2 times daily for 7 days 45 g 0    predniSONE (DELTASONE) 20 MG tablet 1 tab po bid with food for four days then 1 tab po daily with food for three days 11 tablet 0    albuterol sulfate HFA (PROVENTIL HFA) 108 (90 Base) MCG/ACT inhaler Inhale 2 puffs into the lungs every 6 hours as needed for Wheezing or Shortness of Breath 1 each 0    Cholecalciferol (VITAMIN D) 50 MCG (2000 UT) CAPS capsule Take 2 capsules by mouth daily 60 capsule 5    diclofenac sodium (VOLTAREN) 1 % GEL Apply 2 g topically 3 times daily as

## 2023-11-17 ENCOUNTER — OFFICE VISIT (OUTPATIENT)
Dept: OBGYN CLINIC | Age: 38
End: 2023-11-17
Payer: COMMERCIAL

## 2023-11-17 VITALS
BODY MASS INDEX: 23.11 KG/M2 | SYSTOLIC BLOOD PRESSURE: 112 MMHG | DIASTOLIC BLOOD PRESSURE: 68 MMHG | WEIGHT: 143.8 LBS | HEIGHT: 66 IN

## 2023-11-17 DIAGNOSIS — N93.0 PCB (POST COITAL BLEEDING): ICD-10-CM

## 2023-11-17 DIAGNOSIS — N93.9 ABNORMAL UTERINE BLEEDING (AUB): Primary | ICD-10-CM

## 2023-11-17 DIAGNOSIS — Z32.02 PREGNANCY EXAMINATION OR TEST, NEGATIVE RESULT: ICD-10-CM

## 2023-11-17 LAB
HCG, PREGNANCY, URINE, POC: NEGATIVE
T4 FREE SERPL-MCNC: 1 NG/DL (ref 0.78–1.46)
TSH, 3RD GENERATION: 1.42 UIU/ML (ref 0.36–3.74)
VALID INTERNAL CONTROL, POC: YES

## 2023-11-17 PROCEDURE — 76830 TRANSVAGINAL US NON-OB: CPT | Performed by: NURSE PRACTITIONER

## 2023-12-06 ENCOUNTER — TELEPHONE (OUTPATIENT)
Dept: FAMILY MEDICINE CLINIC | Facility: CLINIC | Age: 38
End: 2023-12-06

## 2024-01-31 ENCOUNTER — OFFICE VISIT (OUTPATIENT)
Dept: OBGYN CLINIC | Age: 39
End: 2024-01-31
Payer: COMMERCIAL

## 2024-01-31 ENCOUNTER — PROCEDURE VISIT (OUTPATIENT)
Dept: OBGYN CLINIC | Age: 39
End: 2024-01-31
Payer: COMMERCIAL

## 2024-01-31 VITALS
WEIGHT: 144.2 LBS | BODY MASS INDEX: 23.18 KG/M2 | DIASTOLIC BLOOD PRESSURE: 74 MMHG | SYSTOLIC BLOOD PRESSURE: 118 MMHG | HEIGHT: 66 IN

## 2024-01-31 DIAGNOSIS — N92.1 MENORRHAGIA WITH IRREGULAR CYCLE: Primary | ICD-10-CM

## 2024-01-31 DIAGNOSIS — N93.9 ABNORMAL UTERINE BLEEDING (AUB): Primary | ICD-10-CM

## 2024-01-31 DIAGNOSIS — Z30.41 ENCOUNTER FOR SURVEILLANCE OF CONTRACEPTIVE PILLS: ICD-10-CM

## 2024-01-31 DIAGNOSIS — N94.6 DYSMENORRHEA: ICD-10-CM

## 2024-01-31 DIAGNOSIS — N93.9 ABNORMAL UTERINE BLEEDING (AUB): ICD-10-CM

## 2024-01-31 LAB
HCG, PREGNANCY, URINE, POC: NEGATIVE
HEMOGLOBIN, POC: 10.7 G/DL
VALID INTERNAL CONTROL, POC: YES

## 2024-01-31 PROCEDURE — 85018 HEMOGLOBIN: CPT | Performed by: NURSE PRACTITIONER

## 2024-01-31 PROCEDURE — 81025 URINE PREGNANCY TEST: CPT | Performed by: NURSE PRACTITIONER

## 2024-01-31 PROCEDURE — 76830 TRANSVAGINAL US NON-OB: CPT | Performed by: OBSTETRICS & GYNECOLOGY

## 2024-01-31 PROCEDURE — 99214 OFFICE O/P EST MOD 30 MIN: CPT | Performed by: NURSE PRACTITIONER

## 2024-01-31 NOTE — PROGRESS NOTES
US findings from today 1/31/24  TRANSVAG GYN- AUB  UT- RETROVERTED AND HETEROGENEOUS  ENDO- 6.4MM  ROV-CONTAINS SIMPLE CYST 2.1 X 1.8 X 2.1CM  LOV-CONTAINS SIMPLE CYST 2.1 X 1.6 X 2.1CM  BILATERAL ADNEXAS APPEAR WNL  SMALL AMOUNT FREE FLUID PRESENT IN PCDS

## 2024-01-31 NOTE — PROGRESS NOTES
The patient is a 38 y.o.  who is seen for severe cramping.     Pt c./o severe back pain, HA, dizziness  and cramping. Pt states she has not ever had a period this bad. Had heavy bleeding yesterday but when she woke up this morning and the bleeding stopped. Period has been \"off\" since November. Pt stopped breast feeding in May '23 and periods were normal from May-October and then in November, pt started bleeding sporadically. Changed pad every couple of hours yesterday.     In December she had a similar bleeding pattern to November where she had her period as scheduled for 5 days then a week later had another episode of period.   She then did not have a period again until this past Monday.   She woke up yesterday with very heavy bleeding, using an overnight pad every couple hours with intense pelvic cramping with back pain. Ibuprofen was minimally helpful.     This morning she woke up with HA and nausea, bleeding stopped abruptly at around 10am.   She continues to have pelvic  cramping today.   She took a pregnancy test Monday which was neg.     She is very frustrated with these sx.   We disc starting OCP at her last visit but she was hesitant to proceed. May consider today.     US findings from today 24  TRANSVAG GYN- AUB  UT- RETROVERTED AND HETEROGENEOUS  ENDO- 6.4MM  ROV-CONTAINS SIMPLE CYST 2.1 X 1.8 X 2.1CM  LOV-CONTAINS SIMPLE CYST 2.1 X 1.6 X 2.1CM  BILATERAL ADNEXAS APPEAR WNL  SMALL AMOUNT FREE FLUID PRESENT IN PCDS         HISTORY:      Patient's last menstrual period was 2024 (exact date).  Sexual History:  single partner, contraception - condoms  Contraception:  condoms  Current Outpatient Medications on File Prior to Visit   Medication Sig Dispense Refill    clotrimazole-betamethasone (LOTRISONE) 1-0.05 % cream Apply topically 2 times daily for 7 days 45 g 0    Cholecalciferol (VITAMIN D) 50 MCG (2000) CAPS capsule Take 2 capsules by mouth daily 60 capsule 5    diclofenac

## 2024-02-09 ENCOUNTER — TELEMEDICINE (OUTPATIENT)
Dept: FAMILY MEDICINE CLINIC | Facility: CLINIC | Age: 39
End: 2024-02-09

## 2024-02-09 DIAGNOSIS — Z23 ENCOUNTER FOR IMMUNIZATION: ICD-10-CM

## 2024-02-09 DIAGNOSIS — B35.4 RINGWORM OF BODY: Primary | ICD-10-CM

## 2024-02-09 DIAGNOSIS — R53.83 FATIGUE, UNSPECIFIED TYPE: ICD-10-CM

## 2024-02-09 DIAGNOSIS — E55.9 VITAMIN D INSUFFICIENCY: ICD-10-CM

## 2024-02-09 DIAGNOSIS — N94.6 DYSMENORRHEA: ICD-10-CM

## 2024-02-09 DIAGNOSIS — N92.1 MENORRHAGIA WITH IRREGULAR CYCLE: ICD-10-CM

## 2024-02-09 DIAGNOSIS — Z00.00 LABORATORY EXAM ORDERED AS PART OF ROUTINE GENERAL MEDICAL EXAMINATION: ICD-10-CM

## 2024-02-09 DIAGNOSIS — M19.072 PRIMARY OSTEOARTHRITIS OF LEFT FOOT: ICD-10-CM

## 2024-02-09 DIAGNOSIS — Z12.4 SCREENING FOR CERVICAL CANCER: ICD-10-CM

## 2024-02-09 ASSESSMENT — PATIENT HEALTH QUESTIONNAIRE - PHQ9
SUM OF ALL RESPONSES TO PHQ QUESTIONS 1-9: 0
SUM OF ALL RESPONSES TO PHQ9 QUESTIONS 1 & 2: 0
2. FEELING DOWN, DEPRESSED OR HOPELESS: 0
SUM OF ALL RESPONSES TO PHQ QUESTIONS 1-9: 0
SUM OF ALL RESPONSES TO PHQ QUESTIONS 1-9: 0
1. LITTLE INTEREST OR PLEASURE IN DOING THINGS: 0

## 2024-02-09 NOTE — PROGRESS NOTES
Drug use: No   ,   Family History   Problem Relation Age of Onset    Pancreatic Cancer Paternal Grandmother         Pancreatic    No Known Problems Maternal Grandfather     Heart Attack Father     No Known Problems Mother     Diabetes Maternal Grandmother     No Known Problems Paternal Grandfather          Review of Systems   Constitutional:  Positive for fatigue. Negative for appetite change, chills, diaphoresis, fever and unexpected weight change.        Pt has had an issue with ongoing fatigue. On 11/23/22, pt's CBC, CMP, Vitamin B12 and TFT's were normal. Vitamin D level was in insufficient range at 29.2 Pt was started on Vitamin D 2000 units po daily. Pt took as directed and on 9/19/23, Vitamin D level was improved but still low normal 31.4. Pt's Vitamin D was increased to 4000 units po daily. Pt reports taking as directed and reports noticing some improvement-more mild. 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 Pt denies any depression or anxiety. Pt's TFT's were normal 11/17/23.   HENT: Negative.  Negative for congestion, dental problem, drooling, ear discharge, ear pain, facial swelling, hearing loss, mouth sores, nosebleeds, postnasal drip, rhinorrhea, sinus pressure, sinus pain, sneezing, sore throat, tinnitus, trouble swallowing and voice change.    Eyes: Negative.  Negative for pain, discharge and visual disturbance.   Respiratory: Negative.  Negative for apnea, cough, choking, chest tightness, shortness of breath, wheezing and stridor.    Cardiovascular: Negative.  Negative for chest pain, palpitations and leg swelling.   Gastrointestinal: Negative.  Negative for abdominal distention, abdominal pain, anal bleeding, blood in stool, constipation, diarrhea, nausea, rectal pain and vomiting.   Endocrine: Negative.  Negative for cold intolerance, heat intolerance, polydipsia, polyphagia and polyuria.   Genitourinary:  Positive for menstrual

## 2024-02-21 NOTE — ANESTHESIA POSTPROCEDURE EVALUATION
* No procedures listed *.    epidural    Anesthesia Post Evaluation      Multimodal analgesia: multimodal analgesia used between 6 hours prior to anesthesia start to PACU discharge  Patient location during evaluation: bedside  Patient participation: complete - patient participated  Level of consciousness: awake  Pain score: 2  Pain management: adequate  Airway patency: patent  Anesthetic complications: no  Cardiovascular status: acceptable  Respiratory status: acceptable  Hydration status: acceptable  Comments: Pt pleased with anesthetic. Appears comfortable. Post anesthesia nausea and vomiting:  none      No vitals data found for the desired time range.
2